# Patient Record
Sex: MALE | Race: AMERICAN INDIAN OR ALASKA NATIVE | NOT HISPANIC OR LATINO | Employment: UNEMPLOYED | URBAN - METROPOLITAN AREA
[De-identification: names, ages, dates, MRNs, and addresses within clinical notes are randomized per-mention and may not be internally consistent; named-entity substitution may affect disease eponyms.]

---

## 2017-12-08 ENCOUNTER — OFFICE VISIT (OUTPATIENT)
Dept: INTERNAL MEDICINE | Facility: MEDICAL CENTER | Age: 42
End: 2017-12-08
Payer: MEDICAID

## 2017-12-08 ENCOUNTER — APPOINTMENT (OUTPATIENT)
Dept: INTERNAL MEDICINE | Facility: MEDICAL CENTER | Age: 42
End: 2017-12-08
Payer: MEDICAID

## 2017-12-08 VITALS
SYSTOLIC BLOOD PRESSURE: 133 MMHG | BODY MASS INDEX: 29.23 KG/M2 | OXYGEN SATURATION: 97 % | DIASTOLIC BLOOD PRESSURE: 95 MMHG | RESPIRATION RATE: 20 BRPM | TEMPERATURE: 98.7 F | WEIGHT: 204.2 LBS | HEIGHT: 70 IN | HEART RATE: 116 BPM

## 2017-12-08 DIAGNOSIS — E11.42 DIABETIC POLYNEUROPATHY ASSOCIATED WITH TYPE 2 DIABETES MELLITUS (HCC): ICD-10-CM

## 2017-12-08 DIAGNOSIS — Z72.0 TOBACCO ABUSE: ICD-10-CM

## 2017-12-08 DIAGNOSIS — Z00.00 HEALTH CARE MAINTENANCE: ICD-10-CM

## 2017-12-08 DIAGNOSIS — E11.51 DM (DIABETES MELLITUS), TYPE 2 WITH PERIPHERAL VASCULAR COMPLICATIONS (HCC): ICD-10-CM

## 2017-12-08 DIAGNOSIS — M79.89 PAIN AND SWELLING OF RIGHT LOWER LEG: ICD-10-CM

## 2017-12-08 DIAGNOSIS — I73.9 PAD (PERIPHERAL ARTERY DISEASE) (HCC): ICD-10-CM

## 2017-12-08 DIAGNOSIS — R03.0 ELEVATED BP WITHOUT DIAGNOSIS OF HYPERTENSION: ICD-10-CM

## 2017-12-08 DIAGNOSIS — M79.661 PAIN AND SWELLING OF RIGHT LOWER LEG: ICD-10-CM

## 2017-12-08 PROBLEM — E11.40 DIABETIC NEUROPATHY ASSOCIATED WITH TYPE 2 DIABETES MELLITUS (HCC): Status: ACTIVE | Noted: 2017-12-08

## 2017-12-08 PROCEDURE — 99204 OFFICE O/P NEW MOD 45 MIN: CPT | Mod: GC | Performed by: INTERNAL MEDICINE

## 2017-12-08 RX ORDER — OXYCODONE HYDROCHLORIDE AND ACETAMINOPHEN 5; 325 MG/1; MG/1
1 TABLET ORAL
Refills: 0 | Status: ON HOLD | COMMUNITY
Start: 2017-11-17 | End: 2022-04-05

## 2017-12-08 ASSESSMENT — PATIENT HEALTH QUESTIONNAIRE - PHQ9
SUM OF ALL RESPONSES TO PHQ QUESTIONS 1-9: 4
CLINICAL INTERPRETATION OF PHQ2 SCORE: 1
5. POOR APPETITE OR OVEREATING: 0 - NOT AT ALL

## 2017-12-08 NOTE — PROGRESS NOTES
New Patient to Establish    Reason to establish: Acute Illness    CC: swelling on the R leg x 2 months    HPI: pt is a 40 YO M with PMH of T2DM, Diabetic Neuropathy, PAD was presented to the clinic for the c/o unilateral painful leg swelling x 2 months. Non progressing, non worsening. Worse with walking, improved with resting and leg elevation. Pain is worse with walking. No associated fever/chills, warmth. No trauma. No preceding long flight or prolong bed rest. No personal hx of malignancy. Denies any other medication use other than insulin, lyrica and norco (given for leg pain)   Pt has a hx of PAD, no SHANIQUA documented. He is a smoker, trying to quit. Has a h/o cellulitis on the L leg 1 yr back, this episode doesn't feel like cellulitis to him.    DMT2: since age 23. Insulin dependent. His last A1C was 1 month back, was 7. Allergic to metformin. uptodate with eye exam, done early this year     Neuropathy 2/2 DM: on lyrica. B/l Lower extremity         Patient Active Problem List    Diagnosis Date Noted   • Diabetic neuropathy associated with type 2 diabetes mellitus (CMS-HCC) 12/08/2017   • Sinus tachycardia 12/02/2014   • DM (diabetes mellitus) (CMS-HCC) 12/02/2014   • Other chest pain 12/02/2014   • PAD (peripheral artery disease) 12/02/2014       Past Medical History:   Diagnosis Date   • Broken arm 2012    R       Current Outpatient Prescriptions   Medication Sig Dispense Refill   • Insulin Glargine (LANTUS SC) Inject 20 Units as instructed 2 Times a Day.     • LYRICA 100 MG Cap Take 100 mg by mouth 2 Times a Day.  0   • oxycodone-acetaminophen (PERCOCET) 5-325 MG Tab Take 1 Tab by mouth 3 times a day.  0     No current facility-administered medications for this visit.        Allergies as of 12/08/2017 - Reviewed 12/08/2017   Allergen Reaction Noted   • Amitriptyline  12/06/2010   • Metformin  12/06/2010       Social History     Social History   • Marital status:      Spouse name: N/A   • Number of  "children: 5   • Years of education: N/A     Occupational History   • unemployed       Social History Main Topics   • Smoking status: Current Every Day Smoker     Packs/day: 0.50     Years: 26.00   • Smokeless tobacco: Never Used   • Alcohol use 1.2 oz/week     2 Cans of beer per week   • Drug use:      Frequency: 7.0 times per week     Types: Marijuana   • Sexual activity: Yes     Partners: Female     Birth control/ protection: Condom     Other Topics Concern   • Not on file     Social History Narrative   • No narrative on file       Family History   Problem Relation Age of Onset   • Diabetes Father    • Heart Failure Father        History reviewed. No pertinent surgical history.    ROS: As per HPI. Additional pertinent symptoms as noted below.    Constitutional: negative for fever/ chills,  Malaise, weight loss and diaphoresis.   HENT: Negative for nosebleeds and sore throat.    Eyes: Negative for blurred vision and redness, vision loss  Respiratory: Negative for cough, hemoptysis and shortness of breath.    Cardiovascular: Negative for chest pain, palpitations, orthopnea, LING. R leg swelling   Gastrointestinal: Negative for heartburn, nausea, vomiting and abdominal pain.   Genitourinary: Negative for dysuria, frequency and hematuria.   Musculoskeletal: negative for joint swelling, pain. Negative for myalgias.  Skin: Negative for itching and rash.   Neurological: Negative for dizziness, tingling, sensory change, focal weakness, seizures and weakness.   Endo/Heme/Allergies: Does not bruise/bleed easily.   Psychiatric/Behavioral: Negative for depression. Substance abuse       /95   Pulse (!) 116   Temp 37.1 °C (98.7 °F)   Resp 20   Ht 1.778 m (5' 10\")   Wt 92.6 kg (204 lb 3.2 oz)   SpO2 97%   BMI 29.30 kg/m²     Physical Exam  General:  Alert and oriented, No apparent distress.    Eyes: Pupils equal and reactive. No scleral icterus.    Throat: Clear no erythema or exudates noted.    Neck: Supple. No " lymphadenopathy noted. Thyroid not enlarged.    Lungs: Clear to auscultation and percussion bilaterally.    Cardiovascular: Regular rate and rhythm. No murmurs, rubs or gallops.    Abdomen:  Benign. No rebound or guarding noted.    Extremities: R LE- non-Pitting pedal edema, tender,no warmth, erythema, port of injury, discharge. No palpable pedal pulses    Skin: LE, B/l scars on the shin       Note: I have reviewed all pertinent labs and diagnostic tests associated with this visit with specific comments listed under the assessment and plan below    Assessment and Plan    1 Pain and swelling of right lower leg  - unilateral, painful, R LE swelling.   DDX- DVT, cellulitis, venous insufficiency, lymphedema, less likely arterial insufficieny, CHF, medications etc  - venous doppler for evaluation of clot. Wells score is 2- high risk.   - if positive then pt needs to be initiated on AC. Will f/u with the pt  - CBC for the blood counts, for cellulitis, if clot is r/o        2. DM (diabetes mellitus), type 2 with peripheral vascular complications (CMS-HCC)  No reported worsening, appears stable. latest A1C was 7. Target A1C is <7.   - on insulin 20U am and 10 U pm. Will await medical record, before ordering the labs.     3. Diabetic polyneuropathy associated with type 2 diabetes mellitus (CMS-HCC)  - uses lyrica. Stable. Diabetic monofilament test was normal today    4. Tobacco abuse  persistent smoker despite of diagnosis of PAD. Has weaned down from 1.5 to 0.5 PPD.   - counseling provided, pt made aware to seek help when he needs it and options like medications exist. Not interested at this moment     5. Elevated BP w/o diagnosis of HTN  - BP goal< 130/90. Will continue to monitor. Pt is asked to maintain BP log and if BP is consistently higher than 130/90 (new ACC guideline)  then initiate lisinopril.       6. PAD (peripheral artery disease) (CMS-HCC)  No SHANIQUA documentation.  Continues to smoke  - pt was counseled to stop  smoking, as this will worsen the condition   - SHANIQUA     7. Health care maintenance  - vaccinations- flu- 2017, due in 2018                           TD uptodate,   Labs- will await for the medical records to prevent duplicates. If not available then will do Lipids, CMP, A1C and TSH on next visit       Followup: Return in about 1 month (around 1/8/2018) for PCP.    Risk Assessment (discuss potential complications a function of chronic problems): discussed    Complexity (discuss number of co-morbidities): discussed     Signed by: Jessa Cardenas M.D.

## 2017-12-08 NOTE — LETTER
[facility], [facility]  Jessa Cardenas M.D.  [address]  [place] [address]  Fax: [phone]   Authorization for Release/Disclosure of   Protected Health Information   Name: JUAN BRIGGS : 1975 SSN: [id number]   Address: [address] [place] [address] [address] [place] [address] Phone:    [phone] (home)    I authorize the entity listed below to release/disclose the PHI below to:   [facility], [facility]/Jessa Cardenas M.D. and Jessa Cardenas M.D.   Provider or Entity Name:  [facility]   [place], [place]   Address   City, State, Zip   Phone:      Fax:     Reason for request: continuity of care   Information to be released:    [  ] LAST COLONOSCOPY,  including any PATH REPORT and follow-up  [  ] LAST FIT/COLOGUARD RESULT [  ] LAST DEXA  [  ] LAST MAMMOGRAM  [  ] LAST PAP  [  ] LAST LABS [  ] RETINA EXAM REPORT  [  ] IMMUNIZATION RECORDS  [  ] Release all info      [  ] Check here and initial the line next to each item to release ALL health information INCLUDING  _____ Care and treatment for drug and / or alcohol abuse  _____ HIV testing, infection status, or AIDS  _____ Genetic Testing    DATES OF SERVICE OR TIME PERIOD TO BE DISCLOSED: _____________  I understand and acknowledge that:  * This Authorization may be revoked at any time by you in writing, except if your health information has already been used or disclosed.  * Your health information that will be used or disclosed as a result of you signing this authorization could be re-disclosed by the recipient. If this occurs, your re-disclosed health information may no longer be protected by State or Federal laws.  * You may refuse to sign this Authorization. Your refusal will not affect your ability to obtain treatment.  * This Authorization becomes effective upon signing and will  on (date) __________.      If no date is indicated, this Authorization will  one (1) year from the signature date.    Name: Juan Briggs    Signature:   Date:     2017            PLEASE FAX REQUESTED RECORDS BACK TO: [phone]

## 2017-12-08 NOTE — PATIENT INSTRUCTIONS
"Keep your leg elevated to reduce the swelling  Decrease salt in take  Keep an eye on your diet.    DASH Eating Plan  DASH stands for \"Dietary Approaches to Stop Hypertension.\" The DASH eating plan is a healthy eating plan that has been shown to reduce high blood pressure (hypertension). Additional health benefits may include reducing the risk of type 2 diabetes mellitus, heart disease, and stroke. The DASH eating plan may also help with weight loss.  WHAT DO I NEED TO KNOW ABOUT THE DASH EATING PLAN?  For the DASH eating plan, you will follow these general guidelines:  · Choose foods with a percent daily value for sodium of less than 5% (as listed on the food label).  · Use salt-free seasonings or herbs instead of table salt or sea salt.  · Check with your health care provider or pharmacist before using salt substitutes.  · Eat lower-sodium products, often labeled as \"lower sodium\" or \"no salt added.\"  · Eat fresh foods.  · Eat more vegetables, fruits, and low-fat dairy products.  · Choose whole grains. Look for the word \"whole\" as the first word in the ingredient list.  · Choose fish and skinless chicken or turkey more often than red meat. Limit fish, poultry, and meat to 6 oz (170 g) each day.  · Limit sweets, desserts, sugars, and sugary drinks.  · Choose heart-healthy fats.  · Limit cheese to 1 oz (28 g) per day.  · Eat more home-cooked food and less restaurant, buffet, and fast food.  · Limit fried foods.  · Cook foods using methods other than frying.  · Limit canned vegetables. If you do use them, rinse them well to decrease the sodium.  · When eating at a restaurant, ask that your food be prepared with less salt, or no salt if possible.  WHAT FOODS CAN I EAT?  Seek help from a dietitian for individual calorie needs.  Grains  Whole grain or whole wheat bread. Brown rice. Whole grain or whole wheat pasta. Quinoa, bulgur, and whole grain cereals. Low-sodium cereals. Corn or whole wheat flour tortillas. Whole " grain cornbread. Whole grain crackers. Low-sodium crackers.  Vegetables  Fresh or frozen vegetables (raw, steamed, roasted, or grilled). Low-sodium or reduced-sodium tomato and vegetable juices. Low-sodium or reduced-sodium tomato sauce and paste. Low-sodium or reduced-sodium canned vegetables.   Fruits  All fresh, canned (in natural juice), or frozen fruits.  Meat and Other Protein Products  Ground beef (85% or leaner), grass-fed beef, or beef trimmed of fat. Skinless chicken or turkey. Ground chicken or turkey. Pork trimmed of fat. All fish and seafood. Eggs. Dried beans, peas, or lentils. Unsalted nuts and seeds. Unsalted canned beans.  Dairy  Low-fat dairy products, such as skim or 1% milk, 2% or reduced-fat cheeses, low-fat ricotta or cottage cheese, or plain low-fat yogurt. Low-sodium or reduced-sodium cheeses.  Fats and Oils  Tub margarines without trans fats. Light or reduced-fat mayonnaise and salad dressings (reduced sodium). Avocado. Safflower, olive, or canola oils. Natural peanut or almond butter.  Other  Unsalted popcorn and pretzels.  The items listed above may not be a complete list of recommended foods or beverages. Contact your dietitian for more options.  WHAT FOODS ARE NOT RECOMMENDED?  Grains  White bread. White pasta. White rice. Refined cornbread. Bagels and croissants. Crackers that contain trans fat.  Vegetables  Creamed or fried vegetables. Vegetables in a cheese sauce. Regular canned vegetables. Regular canned tomato sauce and paste. Regular tomato and vegetable juices.  Fruits  Dried fruits. Canned fruit in light or heavy syrup. Fruit juice.  Meat and Other Protein Products  Fatty cuts of meat. Ribs, chicken wings, [place], sausage, bologna, salami, chitterlings, fatback, hot dogs, bratwurst, and packaged luncheon meats. Salted nuts and seeds. Canned beans with salt.  Dairy  Whole or 2% milk, cream, half-and-half, and cream cheese. Whole-fat or sweetened yogurt. Full-fat cheeses or blue  cheese. Nondairy creamers and whipped toppings. Processed cheese, cheese spreads, or cheese curds.  Condiments  Onion and garlic salt, seasoned salt, table salt, and sea salt. Canned and packaged gravies. Worcestershire sauce. Tartar sauce. Barbecue sauce. Teriyaki sauce. Soy sauce, including reduced sodium. Steak sauce. Fish sauce. Oyster sauce. Cocktail sauce. Horseradish. Ketchup and mustard. Meat flavorings and tenderizers. Bouillon cubes. Hot sauce. Tabasco sauce. Marinades. Taco seasonings. Relishes.  Fats and Oils  Butter, stick margarine, lard, shortening, ghee, and [place] fat. Coconut, palm kernel, or palm oils. Regular salad dressings.  Other  Pickles and olives. Salted popcorn and pretzels.  The items listed above may not be a complete list of foods and beverages to avoid. Contact your dietitian for more information.  WHERE CAN I FIND MORE INFORMATION?  National Heart, [employer], and [employer] [place]: www.nhlbi.nih.gov/health/health-topics/topics/dash/     This information is not intended to replace advice given to you by your health care provider. Make sure you discuss any questions you have with your health care provider.     Document Released: 12/06/2012 Document Revised: 01/08/2016 Document Reviewed: 10/22/2014  [employer] Interactive Patient Education ©2016 [employer]

## 2017-12-08 NOTE — ASSESSMENT & PLAN NOTE
T2DM, age of onset at age 23. Insulin 20 U AM and 10 in evening. No h/o DKA  A1C last  Month was 7

## 2017-12-15 ENCOUNTER — APPOINTMENT (OUTPATIENT)
Dept: RADIOLOGY | Facility: MEDICAL CENTER | Age: 42
End: 2017-12-15
Attending: INTERNAL MEDICINE
Payer: MEDICAID

## 2022-04-01 ENCOUNTER — HOSPITAL ENCOUNTER (OUTPATIENT)
Facility: MEDICAL CENTER | Age: 47
End: 2022-04-01
Attending: PHYSICAL MEDICINE & REHABILITATION
Payer: MEDICAID

## 2022-04-01 ENCOUNTER — HOSPITAL ENCOUNTER (INPATIENT)
Facility: REHABILITATION | Age: 47
LOS: 4 days | DRG: 560 | End: 2022-04-05
Attending: PHYSICAL MEDICINE & REHABILITATION | Admitting: PHYSICAL MEDICINE & REHABILITATION
Payer: MEDICAID

## 2022-04-01 DIAGNOSIS — E11.65 TYPE 2 DIABETES MELLITUS WITH HYPERGLYCEMIA, UNSPECIFIED WHETHER LONG TERM INSULIN USE (HCC): ICD-10-CM

## 2022-04-01 DIAGNOSIS — E55.9 VITAMIN D DEFICIENCY: ICD-10-CM

## 2022-04-01 DIAGNOSIS — G54.6 PHANTOM LIMB PAIN (HCC): ICD-10-CM

## 2022-04-01 DIAGNOSIS — I10 PRIMARY HYPERTENSION: ICD-10-CM

## 2022-04-01 DIAGNOSIS — Z89.512 S/P BKA (BELOW KNEE AMPUTATION) UNILATERAL, LEFT (HCC): ICD-10-CM

## 2022-04-01 PROBLEM — Z00.00 HEALTH CARE MAINTENANCE: Status: RESOLVED | Noted: 2017-12-08 | Resolved: 2022-04-01

## 2022-04-01 LAB
GLUCOSE BLD STRIP.AUTO-MCNC: 175 MG/DL (ref 65–99)
GLUCOSE BLD STRIP.AUTO-MCNC: 181 MG/DL (ref 65–99)
GLUCOSE BLD STRIP.AUTO-MCNC: 226 MG/DL (ref 65–99)

## 2022-04-01 PROCEDURE — 0240U HCHG SARS-COV-2 COVID-19 NFCT DS RESP RNA 3 TRGT MIC: CPT

## 2022-04-01 PROCEDURE — 770010 HCHG ROOM/CARE - REHAB SEMI PRIVAT*

## 2022-04-01 PROCEDURE — 82962 GLUCOSE BLOOD TEST: CPT | Mod: 91

## 2022-04-01 PROCEDURE — 700111 HCHG RX REV CODE 636 W/ 250 OVERRIDE (IP): Performed by: PHYSICAL MEDICINE & REHABILITATION

## 2022-04-01 PROCEDURE — 700102 HCHG RX REV CODE 250 W/ 637 OVERRIDE(OP): Performed by: PHYSICAL MEDICINE & REHABILITATION

## 2022-04-01 PROCEDURE — 99223 1ST HOSP IP/OBS HIGH 75: CPT | Performed by: PHYSICAL MEDICINE & REHABILITATION

## 2022-04-01 PROCEDURE — 94760 N-INVAS EAR/PLS OXIMETRY 1: CPT

## 2022-04-01 PROCEDURE — A9270 NON-COVERED ITEM OR SERVICE: HCPCS | Performed by: PHYSICAL MEDICINE & REHABILITATION

## 2022-04-01 RX ORDER — LISINOPRIL 20 MG/1
20 TABLET ORAL
Status: DISCONTINUED | OUTPATIENT
Start: 2022-04-02 | End: 2022-04-03

## 2022-04-01 RX ORDER — AMOXICILLIN 250 MG
2 CAPSULE ORAL 2 TIMES DAILY
Status: DISCONTINUED | OUTPATIENT
Start: 2022-04-01 | End: 2022-04-05 | Stop reason: HOSPADM

## 2022-04-01 RX ORDER — OXYCODONE HYDROCHLORIDE 5 MG/1
5 TABLET ORAL EVERY 4 HOURS PRN
Status: DISCONTINUED | OUTPATIENT
Start: 2022-04-01 | End: 2022-04-05 | Stop reason: HOSPADM

## 2022-04-01 RX ORDER — POLYETHYLENE GLYCOL 3350 17 G/17G
1 POWDER, FOR SOLUTION ORAL
Status: DISCONTINUED | OUTPATIENT
Start: 2022-04-01 | End: 2022-04-05 | Stop reason: HOSPADM

## 2022-04-01 RX ORDER — ONDANSETRON 4 MG/1
4 TABLET, ORALLY DISINTEGRATING ORAL 4 TIMES DAILY PRN
Status: DISCONTINUED | OUTPATIENT
Start: 2022-04-01 | End: 2022-04-05 | Stop reason: HOSPADM

## 2022-04-01 RX ORDER — DEXTROSE MONOHYDRATE 25 G/50ML
25 INJECTION, SOLUTION INTRAVENOUS
Status: DISCONTINUED | OUTPATIENT
Start: 2022-04-01 | End: 2022-04-01

## 2022-04-01 RX ORDER — ACETAMINOPHEN 325 MG/1
650 TABLET ORAL EVERY 6 HOURS PRN
Status: DISCONTINUED | OUTPATIENT
Start: 2022-04-06 | End: 2022-04-05 | Stop reason: HOSPADM

## 2022-04-01 RX ORDER — TRAZODONE HYDROCHLORIDE 50 MG/1
50 TABLET ORAL
Status: DISCONTINUED | OUTPATIENT
Start: 2022-04-01 | End: 2022-04-05 | Stop reason: HOSPADM

## 2022-04-01 RX ORDER — ONDANSETRON 2 MG/ML
4 INJECTION INTRAMUSCULAR; INTRAVENOUS 4 TIMES DAILY PRN
Status: DISCONTINUED | OUTPATIENT
Start: 2022-04-01 | End: 2022-04-05 | Stop reason: HOSPADM

## 2022-04-01 RX ORDER — ECHINACEA PURPUREA EXTRACT 125 MG
2 TABLET ORAL PRN
Status: DISCONTINUED | OUTPATIENT
Start: 2022-04-01 | End: 2022-04-05 | Stop reason: HOSPADM

## 2022-04-01 RX ORDER — ACETAMINOPHEN 325 MG/1
650 TABLET ORAL EVERY 6 HOURS
Status: DISCONTINUED | OUTPATIENT
Start: 2022-04-01 | End: 2022-04-05 | Stop reason: HOSPADM

## 2022-04-01 RX ORDER — OXYCODONE HYDROCHLORIDE 5 MG/1
5 TABLET ORAL EVERY 4 HOURS PRN
Status: DISCONTINUED | OUTPATIENT
Start: 2022-04-01 | End: 2022-04-01

## 2022-04-01 RX ORDER — POLYVINYL ALCOHOL 14 MG/ML
1 SOLUTION/ DROPS OPHTHALMIC PRN
Status: DISCONTINUED | OUTPATIENT
Start: 2022-04-01 | End: 2022-04-05 | Stop reason: HOSPADM

## 2022-04-01 RX ORDER — OMEPRAZOLE 20 MG/1
20 CAPSULE, DELAYED RELEASE ORAL DAILY
Status: DISCONTINUED | OUTPATIENT
Start: 2022-04-01 | End: 2022-04-05 | Stop reason: HOSPADM

## 2022-04-01 RX ORDER — GABAPENTIN 300 MG/1
300 CAPSULE ORAL
Status: DISCONTINUED | OUTPATIENT
Start: 2022-04-01 | End: 2022-04-05

## 2022-04-01 RX ORDER — DEXTROSE MONOHYDRATE 25 G/50ML
25 INJECTION, SOLUTION INTRAVENOUS
Status: DISCONTINUED | OUTPATIENT
Start: 2022-04-01 | End: 2022-04-02

## 2022-04-01 RX ORDER — BISACODYL 10 MG
10 SUPPOSITORY, RECTAL RECTAL
Status: DISCONTINUED | OUTPATIENT
Start: 2022-04-01 | End: 2022-04-05 | Stop reason: HOSPADM

## 2022-04-01 RX ORDER — ACETAMINOPHEN 325 MG/1
650 TABLET ORAL EVERY 4 HOURS PRN
Status: DISCONTINUED | OUTPATIENT
Start: 2022-04-01 | End: 2022-04-05 | Stop reason: HOSPADM

## 2022-04-01 RX ORDER — ALUMINA, MAGNESIA, AND SIMETHICONE 2400; 2400; 240 MG/30ML; MG/30ML; MG/30ML
20 SUSPENSION ORAL
Status: DISCONTINUED | OUTPATIENT
Start: 2022-04-01 | End: 2022-04-05 | Stop reason: HOSPADM

## 2022-04-01 RX ADMIN — ENOXAPARIN SODIUM 40 MG: 40 INJECTION SUBCUTANEOUS at 17:21

## 2022-04-01 RX ADMIN — ACETAMINOPHEN 650 MG: 325 TABLET ORAL at 17:21

## 2022-04-01 RX ADMIN — OXYCODONE 5 MG: 5 TABLET ORAL at 17:21

## 2022-04-01 RX ADMIN — INSULIN HUMAN 1 UNITS: 100 INJECTION, SOLUTION PARENTERAL at 21:19

## 2022-04-01 RX ADMIN — ACETAMINOPHEN 650 MG: 325 TABLET ORAL at 12:44

## 2022-04-01 RX ADMIN — GABAPENTIN 300 MG: 300 CAPSULE ORAL at 21:18

## 2022-04-01 RX ADMIN — INSULIN HUMAN 2 UNITS: 100 INJECTION, SOLUTION PARENTERAL at 12:46

## 2022-04-01 RX ADMIN — INSULIN HUMAN 1 UNITS: 100 INJECTION, SOLUTION PARENTERAL at 17:24

## 2022-04-01 RX ADMIN — SENNOSIDES AND DOCUSATE SODIUM 2 TABLET: 50; 8.6 TABLET ORAL at 12:44

## 2022-04-01 RX ADMIN — OMEPRAZOLE 20 MG: 20 CAPSULE, DELAYED RELEASE ORAL at 12:44

## 2022-04-01 RX ADMIN — INSULIN GLARGINE 10 UNITS: 100 INJECTION, SOLUTION SUBCUTANEOUS at 21:20

## 2022-04-01 RX ADMIN — OXYCODONE 5 MG: 5 TABLET ORAL at 13:19

## 2022-04-01 RX ADMIN — OXYCODONE 5 MG: 5 TABLET ORAL at 21:17

## 2022-04-01 ASSESSMENT — PATIENT HEALTH QUESTIONNAIRE - PHQ9
6. FEELING BAD ABOUT YOURSELF - OR THAT YOU ARE A FAILURE OR HAVE LET YOURSELF OR YOUR FAMILY DOWN: SEVERAL DAYS
SUM OF ALL RESPONSES TO PHQ QUESTIONS 1-9: 9
8. MOVING OR SPEAKING SO SLOWLY THAT OTHER PEOPLE COULD HAVE NOTICED. OR THE OPPOSITE, BEING SO FIGETY OR RESTLESS THAT YOU HAVE BEEN MOVING AROUND A LOT MORE THAN USUAL: SEVERAL DAYS
7. TROUBLE CONCENTRATING ON THINGS, SUCH AS READING THE NEWSPAPER OR WATCHING TELEVISION: SEVERAL DAYS
1. LITTLE INTEREST OR PLEASURE IN DOING THINGS: SEVERAL DAYS
3. TROUBLE FALLING OR STAYING ASLEEP OR SLEEPING TOO MUCH: SEVERAL DAYS
5. POOR APPETITE OR OVEREATING: SEVERAL DAYS
SUM OF ALL RESPONSES TO PHQ9 QUESTIONS 1 AND 2: 2
4. FEELING TIRED OR HAVING LITTLE ENERGY: SEVERAL DAYS
2. FEELING DOWN, DEPRESSED, IRRITABLE, OR HOPELESS: SEVERAL DAYS
9. THOUGHTS THAT YOU WOULD BE BETTER OFF DEAD, OR OF HURTING YOURSELF: SEVERAL DAYS

## 2022-04-01 ASSESSMENT — LIFESTYLE VARIABLES
TOTAL SCORE: 0
EVER_SMOKED: YES
EVER HAD A DRINK FIRST THING IN THE MORNING TO STEADY YOUR NERVES TO GET RID OF A HANGOVER: NO
HAVE YOU EVER FELT YOU SHOULD CUT DOWN ON YOUR DRINKING: NO
CONSUMPTION TOTAL: NEGATIVE
AVERAGE NUMBER OF DAYS PER WEEK YOU HAVE A DRINK CONTAINING ALCOHOL: 0
EVER FELT BAD OR GUILTY ABOUT YOUR DRINKING: NO
TOTAL SCORE: 0
HOW MANY TIMES IN THE PAST YEAR HAVE YOU HAD 5 OR MORE DRINKS IN A DAY: 0
HAVE PEOPLE ANNOYED YOU BY CRITICIZING YOUR DRINKING: NO
TOTAL SCORE: 0
ALCOHOL_USE: NO
ON A TYPICAL DAY WHEN YOU DRINK ALCOHOL HOW MANY DRINKS DO YOU HAVE: 0

## 2022-04-01 ASSESSMENT — PAIN DESCRIPTION - PAIN TYPE
TYPE: ACUTE PAIN;SURGICAL PAIN;NEUROPATHIC PAIN
TYPE: PHANTOM PAIN
TYPE: PHANTOM PAIN

## 2022-04-01 NOTE — PROGRESS NOTES
Patient admitted to facility at w/c via [facility] transport.  Patient assisted to room and positioned in bed for comfort and safety; call light within reach.  Patient assisted with stowing belongings and oriented to room and facility.  Admission assessment performed and documented in computer.  Admission paperwork completed; signed copies placed in chart.  Will continue to monitor.

## 2022-04-01 NOTE — PROGRESS NOTES
4 Eyes Skin Assessment Completed by YARITZA Diaz and Jhonny RN.    Head WDL  Ears WDL  Nose WDL  Mouth WDL  Neck WDL  Breast/Chest WDL  Shoulder Blades WDL  Spine WDL  (R) Arm/Elbow/Hand WDL  (L) Arm/Elbow/Hand WDL  Abdomen WDL  Groin WDL  Scrotum/Coccyx/Buttocks Blanching dry flaky itch cream see photo moisture barrier applied  (R) Leg WDL  (L) Leg Swelling BKA  See photo. Dressing changed  (R) Heel/Foot/Toe Scab on toes thick hard nails.  (L) Heel/Foot/Toe BKA          Devices In Places N/A      Interventions In Place Pillows and Barrier Cream    Possible Skin Injury N/A    Pictures Uploaded Into Epic YES  Wound Consult Placed N/A  RN Wound Prevention Protocol Ordered No

## 2022-04-02 ENCOUNTER — APPOINTMENT (OUTPATIENT)
Dept: RADIOLOGY | Facility: REHABILITATION | Age: 47
DRG: 560 | End: 2022-04-02
Attending: PHYSICAL MEDICINE & REHABILITATION
Payer: MEDICAID

## 2022-04-02 PROBLEM — R74.8 ALKALINE PHOSPHATASE ELEVATION: Status: ACTIVE | Noted: 2022-04-02

## 2022-04-02 PROBLEM — E55.9 VITAMIN D DEFICIENCY: Status: ACTIVE | Noted: 2022-04-02

## 2022-04-02 PROBLEM — D72.829 LEUKOCYTOSIS: Status: ACTIVE | Noted: 2022-04-02

## 2022-04-02 PROBLEM — D64.9 ANEMIA: Status: ACTIVE | Noted: 2022-04-02

## 2022-04-02 PROBLEM — I10 HYPERTENSION: Status: ACTIVE | Noted: 2017-12-08

## 2022-04-02 PROBLEM — D75.839 THROMBOCYTOSIS: Status: ACTIVE | Noted: 2022-04-02

## 2022-04-02 LAB
25(OH)D3 SERPL-MCNC: 6 NG/ML (ref 30–100)
ALBUMIN SERPL BCP-MCNC: 2.8 G/DL (ref 3.2–4.9)
ALBUMIN/GLOB SERPL: 0.5 G/DL
ALP SERPL-CCNC: 139 U/L (ref 30–99)
ALT SERPL-CCNC: 15 U/L (ref 2–50)
ANION GAP SERPL CALC-SCNC: 11 MMOL/L (ref 7–16)
APPEARANCE UR: CLEAR
AST SERPL-CCNC: 26 U/L (ref 12–45)
BACTERIA #/AREA URNS HPF: NEGATIVE /HPF
BASOPHILS # BLD AUTO: 0.3 % (ref 0–1.8)
BASOPHILS # BLD: 0.04 K/UL (ref 0–0.12)
BILIRUB SERPL-MCNC: 0.3 MG/DL (ref 0.1–1.5)
BILIRUB UR QL STRIP.AUTO: NEGATIVE
BUN SERPL-MCNC: 19 MG/DL (ref 8–22)
CALCIUM SERPL-MCNC: 9.2 MG/DL (ref 8.5–10.5)
CHLORIDE SERPL-SCNC: 100 MMOL/L (ref 96–112)
CO2 SERPL-SCNC: 23 MMOL/L (ref 20–33)
COLOR UR: YELLOW
CREAT SERPL-MCNC: 0.89 MG/DL (ref 0.5–1.4)
EOSINOPHIL # BLD AUTO: 0.28 K/UL (ref 0–0.51)
EOSINOPHIL NFR BLD: 2.3 % (ref 0–6.9)
EPI CELLS #/AREA URNS HPF: NEGATIVE /HPF
ERYTHROCYTE [DISTWIDTH] IN BLOOD BY AUTOMATED COUNT: 42.4 FL (ref 35.9–50)
FLUAV RNA SPEC QL NAA+PROBE: NEGATIVE
FLUBV RNA SPEC QL NAA+PROBE: NEGATIVE
GFR SERPLBLD CREATININE-BSD FMLA CKD-EPI: 106 ML/MIN/1.73 M 2
GLOBULIN SER CALC-MCNC: 5.2 G/DL (ref 1.9–3.5)
GLUCOSE BLD STRIP.AUTO-MCNC: 111 MG/DL (ref 65–99)
GLUCOSE BLD STRIP.AUTO-MCNC: 192 MG/DL (ref 65–99)
GLUCOSE BLD STRIP.AUTO-MCNC: 202 MG/DL (ref 65–99)
GLUCOSE BLD STRIP.AUTO-MCNC: 242 MG/DL (ref 65–99)
GLUCOSE SERPL-MCNC: 142 MG/DL (ref 65–99)
GLUCOSE UR STRIP.AUTO-MCNC: 250 MG/DL
HCT VFR BLD AUTO: 32.3 % (ref 42–52)
HGB BLD-MCNC: 10.8 G/DL (ref 14–18)
HYALINE CASTS #/AREA URNS LPF: ABNORMAL /LPF
IMM GRANULOCYTES # BLD AUTO: 0.08 K/UL (ref 0–0.11)
IMM GRANULOCYTES NFR BLD AUTO: 0.7 % (ref 0–0.9)
KETONES UR STRIP.AUTO-MCNC: NEGATIVE MG/DL
LEUKOCYTE ESTERASE UR QL STRIP.AUTO: NEGATIVE
LYMPHOCYTES # BLD AUTO: 1.99 K/UL (ref 1–4.8)
LYMPHOCYTES NFR BLD: 16.4 % (ref 22–41)
MCH RBC QN AUTO: 29.3 PG (ref 27–33)
MCHC RBC AUTO-ENTMCNC: 33.4 G/DL (ref 33.7–35.3)
MCV RBC AUTO: 87.8 FL (ref 81.4–97.8)
MICRO URNS: ABNORMAL
MONOCYTES # BLD AUTO: 0.53 K/UL (ref 0–0.85)
MONOCYTES NFR BLD AUTO: 4.4 % (ref 0–13.4)
NEUTROPHILS # BLD AUTO: 9.21 K/UL (ref 1.82–7.42)
NEUTROPHILS NFR BLD: 75.9 % (ref 44–72)
NITRITE UR QL STRIP.AUTO: NEGATIVE
NRBC # BLD AUTO: 0 K/UL
NRBC BLD-RTO: 0 /100 WBC
PH UR STRIP.AUTO: 6.5 [PH] (ref 5–8)
PLATELET # BLD AUTO: 453 K/UL (ref 164–446)
PMV BLD AUTO: 9.4 FL (ref 9–12.9)
POTASSIUM SERPL-SCNC: 4 MMOL/L (ref 3.6–5.5)
PROT SERPL-MCNC: 8 G/DL (ref 6–8.2)
PROT UR QL STRIP: 100 MG/DL
RBC # BLD AUTO: 3.68 M/UL (ref 4.7–6.1)
RBC # URNS HPF: ABNORMAL /HPF
RBC UR QL AUTO: ABNORMAL
SARS-COV-2 RNA RESP QL NAA+PROBE: NOTDETECTED
SODIUM SERPL-SCNC: 134 MMOL/L (ref 135–145)
SP GR UR STRIP.AUTO: 1.01
SPECIMEN SOURCE: NORMAL
UROBILINOGEN UR STRIP.AUTO-MCNC: 0.2 MG/DL
WBC # BLD AUTO: 12.1 K/UL (ref 4.8–10.8)
WBC #/AREA URNS HPF: ABNORMAL /HPF

## 2022-04-02 PROCEDURE — 97166 OT EVAL MOD COMPLEX 45 MIN: CPT

## 2022-04-02 PROCEDURE — 85025 COMPLETE CBC W/AUTO DIFF WBC: CPT

## 2022-04-02 PROCEDURE — 97535 SELF CARE MNGMENT TRAINING: CPT

## 2022-04-02 PROCEDURE — 80053 COMPREHEN METABOLIC PANEL: CPT

## 2022-04-02 PROCEDURE — 71045 X-RAY EXAM CHEST 1 VIEW: CPT

## 2022-04-02 PROCEDURE — 82306 VITAMIN D 25 HYDROXY: CPT

## 2022-04-02 PROCEDURE — 81001 URINALYSIS AUTO W/SCOPE: CPT

## 2022-04-02 PROCEDURE — A9270 NON-COVERED ITEM OR SERVICE: HCPCS | Performed by: PHYSICAL MEDICINE & REHABILITATION

## 2022-04-02 PROCEDURE — 83036 HEMOGLOBIN GLYCOSYLATED A1C: CPT

## 2022-04-02 PROCEDURE — 700111 HCHG RX REV CODE 636 W/ 250 OVERRIDE (IP): Performed by: PHYSICAL MEDICINE & REHABILITATION

## 2022-04-02 PROCEDURE — 97110 THERAPEUTIC EXERCISES: CPT

## 2022-04-02 PROCEDURE — 97530 THERAPEUTIC ACTIVITIES: CPT

## 2022-04-02 PROCEDURE — 770010 HCHG ROOM/CARE - REHAB SEMI PRIVAT*

## 2022-04-02 PROCEDURE — 99233 SBSQ HOSP IP/OBS HIGH 50: CPT | Performed by: PHYSICAL MEDICINE & REHABILITATION

## 2022-04-02 PROCEDURE — 82962 GLUCOSE BLOOD TEST: CPT | Mod: 91

## 2022-04-02 PROCEDURE — 97161 PT EVAL LOW COMPLEX 20 MIN: CPT

## 2022-04-02 PROCEDURE — 700102 HCHG RX REV CODE 250 W/ 637 OVERRIDE(OP): Performed by: PHYSICAL MEDICINE & REHABILITATION

## 2022-04-02 PROCEDURE — 36415 COLL VENOUS BLD VENIPUNCTURE: CPT

## 2022-04-02 PROCEDURE — 99253 IP/OBS CNSLTJ NEW/EST LOW 45: CPT | Performed by: HOSPITALIST

## 2022-04-02 RX ORDER — ERGOCALCIFEROL 1.25 MG/1
50000 CAPSULE ORAL
Status: DISCONTINUED | OUTPATIENT
Start: 2022-04-02 | End: 2022-04-05 | Stop reason: HOSPADM

## 2022-04-02 RX ORDER — DEXTROSE MONOHYDRATE 25 G/50ML
25 INJECTION, SOLUTION INTRAVENOUS
Status: DISCONTINUED | OUTPATIENT
Start: 2022-04-02 | End: 2022-04-05 | Stop reason: HOSPADM

## 2022-04-02 RX ADMIN — ACETAMINOPHEN 650 MG: 325 TABLET ORAL at 23:28

## 2022-04-02 RX ADMIN — ACETAMINOPHEN 650 MG: 325 TABLET ORAL at 11:27

## 2022-04-02 RX ADMIN — ERGOCALCIFEROL 50000 UNITS: 1.25 CAPSULE ORAL at 11:22

## 2022-04-02 RX ADMIN — ACETAMINOPHEN 650 MG: 325 TABLET ORAL at 04:59

## 2022-04-02 RX ADMIN — GABAPENTIN 300 MG: 300 CAPSULE ORAL at 21:56

## 2022-04-02 RX ADMIN — INSULIN HUMAN 2 UNITS: 100 INJECTION, SOLUTION PARENTERAL at 11:22

## 2022-04-02 RX ADMIN — INSULIN HUMAN 1 UNITS: 100 INJECTION, SOLUTION PARENTERAL at 08:07

## 2022-04-02 RX ADMIN — OXYCODONE 5 MG: 5 TABLET ORAL at 15:17

## 2022-04-02 RX ADMIN — ACETAMINOPHEN 650 MG: 325 TABLET ORAL at 17:07

## 2022-04-02 RX ADMIN — ENOXAPARIN SODIUM 40 MG: 40 INJECTION SUBCUTANEOUS at 15:17

## 2022-04-02 RX ADMIN — SENNOSIDES AND DOCUSATE SODIUM 2 TABLET: 50; 8.6 TABLET ORAL at 08:09

## 2022-04-02 RX ADMIN — INSULIN GLARGINE 10 UNITS: 100 INJECTION, SOLUTION SUBCUTANEOUS at 21:59

## 2022-04-02 RX ADMIN — OMEPRAZOLE 20 MG: 20 CAPSULE, DELAYED RELEASE ORAL at 08:09

## 2022-04-02 RX ADMIN — OXYCODONE 5 MG: 5 TABLET ORAL at 04:59

## 2022-04-02 RX ADMIN — OXYCODONE 5 MG: 5 TABLET ORAL at 19:27

## 2022-04-02 ASSESSMENT — GAIT ASSESSMENTS
GAIT LEVEL OF ASSIST: CONTACT GUARD ASSIST
DEVIATION: STEP TO;BRADYKINETIC
ASSISTIVE DEVICE: FRONT WHEEL WALKER
DISTANCE (FEET): 120

## 2022-04-02 ASSESSMENT — ENCOUNTER SYMPTOMS
CHILLS: 0
EYES NEGATIVE: 1
NAUSEA: 0
WEAKNESS: 1
COUGH: 0
SHORTNESS OF BREATH: 0
ABDOMINAL PAIN: 0
VOMITING: 0
POLYDIPSIA: 0
BRUISES/BLEEDS EASILY: 0
FEVER: 0
PALPITATIONS: 0

## 2022-04-02 ASSESSMENT — BRIEF INTERVIEW FOR MENTAL STATUS (BIMS)
WHAT DAY OF THE WEEK IS IT: CORRECT
WHAT YEAR IS IT: CORRECT
BIMS SUMMARY SCORE: 13
INITIAL REPETITION OF BED BLUE SOCK - FIRST ATTEMPT: 3
ASKED TO RECALL BLUE: YES, NO CUE REQUIRED
WHAT MONTH IS IT: ACCURATE WITHIN 5 DAYS
ASKED TO RECALL BED: NO, COULD NOT RECALL
ASKED TO RECALL SOCK: YES, NO CUE REQUIRED

## 2022-04-02 ASSESSMENT — ACTIVITIES OF DAILY LIVING (ADL)
TOILETING: INDEPENDENT
BED_CHAIR_WHEELCHAIR_TRANSFER_DESCRIPTION: ADAPTIVE EQUIPMENT
TOILETING_LEVEL_OF_ASSIST_DESCRIPTION: INITIAL PREPARATION FOR TASK;SUPERVISION FOR SAFETY
BED_CHAIR_WHEELCHAIR_TRANSFER_DESCRIPTION: ADAPTIVE EQUIPMENT;SUPERVISION FOR SAFETY
BED_CHAIR_WHEELCHAIR_TRANSFER_DESCRIPTION: ADAPTIVE EQUIPMENT;SUPERVISION FOR SAFETY
TOILET_TRANSFER_DESCRIPTION: ADAPTIVE EQUIPMENT;INCREASED TIME;SUPERVISION FOR SAFETY
TUB_SHOWER_TRANSFER_DESCRIPTION: SHOWER BENCH;INCREASED TIME;SET-UP OF EQUIPMENT;SUPERVISION FOR SAFETY

## 2022-04-02 ASSESSMENT — PAIN DESCRIPTION - PAIN TYPE
TYPE: PHANTOM PAIN
TYPE: PHANTOM PAIN
TYPE: ACUTE PAIN;SURGICAL PAIN;PHANTOM PAIN

## 2022-04-02 NOTE — CARE PLAN
Problem: Knowledge Deficit - Standard  Goal: Patient and family/care givers will demonstrate understanding of plan of care, disease process/condition, diagnostic tests and medications  Outcome: Progressing   Pt education given regarding plan of care, pt shows understanding, with emphasis on admit to rehab, will continue to reinforce education and continue to monitor.         Problem: Fall Risk - Rehab  Goal: Patient will remain free from falls  Outcome: Progressing   Pt education given regarding fall precautions AND safety measures, pt shows good understanding, has not attempted to self transfer this shift, will continue to reinforce education and continue to monitor.

## 2022-04-02 NOTE — PROGRESS NOTES
Took over care pt from admit RN, pt resting in bed, call light with in reach, bed in lowest position, all fall precautions and alarms in  Place, pt denies any further needs at this time, shows nos/s of distress, will continue to monitor.

## 2022-04-02 NOTE — PROGRESS NOTES
Patient care assumed. Report received from day RN Jhonny. Patient is alert and calm, resting in bed. Call light and bedside table within reach. Will continue to monitor.

## 2022-04-02 NOTE — THERAPY
Physical Therapy   Daily Treatment     Patient Name: Juan Lantigua  Age:  46 y.o., Sex:  male  Medical Record #: [id number]  Today's Date: 4/2/2022     Precautions  Precautions: (P) Fall Risk,Non Weight Bearing Left Lower Extremity    Subjective    I just called the nurse for pain medication     Objective     04/02/22 1501   Precautions   Precautions Fall Risk;Non Weight Bearing Left Lower Extremity   Pain   Intervention Medication (see MAR)   Pain 0 - 10 Group   Location Leg   Location Orientation Left   Pain Rating Scale (NPRS) 7   Description Throbbing  (phantom)   Therapist Pain Assessment Nurse Notified   Non Verbal Descriptors   Non Verbal Scale  Calm   Supine Lower Body Exercise   Hip Abduction 1 set of 10;Side Lying;Left   Hip Adduction  1 set of 10;Bilateral   Straight Leg Raises 1 set of 10;Front;Left   Knee to Chest 1 set of 10;Left   Short Arc Quad 1 set of 10;Left   Quadriceps Isometrics 1 set of 10;Bilateral   Other Exercises hip extension in sidelying, L LE 10 reps   Comments prone lying x10 minutes with manual hip flexor stretch, 30 seconds x 3   Neuro-Muscular Treatments   Neuro-Muscular Treatments Sequencing;Verbal Cuing   Interdisciplinary Plan of Care Collaboration   IDT Collaboration with  Nursing   Patient Position at End of Therapy In Bed;Tray Table within Reach;Call Light within Reach;Phone within Reach   Collaboration Comments pain level   PT Total Time Spent   PT Individual Total Time Spent (Mins) 30   PT Charge Group   PT Therapeutic Exercise 2       Assessment    Handout reviewed regarding LE strengthening for L residual limb. Patient able to complete all exercises with VC and tactile cues. Modified hip abduction and hip extension of L LE in sidelying.  Patient very motivated for all handouts and stretches. Pain limiting OOB activities.     Strengths: Able to follow instructions,Adequate strength,Alert and oriented,Effective communication skills,Independent prior level of function,Motivated  for self care and independence,Pleasant and cooperative,Willingly participates in therapeutic activities  Barriers: Limited mobility,Pain,Fatigue    Plan    WC mobility indoor/outdoor, gait with FWW, progress HEP, transfer training from different surface heights. Residual limb pain management.    Passport items to be completed:  Get in/out of bed safely, in/out of a vehicle, safely use mobility device, walk or wheel around home/community, navigate up and down stairs, show how to get up/down from the ground, ensure home is accessible, demonstrate HEP, complete caregiver training    Physical Therapy Problems (Active)       Problem: Balance       Dates: Start: 04/02/22         Goal: STG-Within one week, patient will maintain dynamic standing x 5 min with FWW, SBA       Dates: Start: 04/02/22               Problem: Mobility       Dates: Start: 04/02/22         Goal: STG-Within one week, patient will propel wheelchair community distances  Lucinda over indoor surfaces, supervised outdoor surfaces, >300ft       Dates: Start: 04/02/22            Goal: STG-Within one week, patient will ambulate household distance with FWW, SBA, 150ft.       Dates: Start: 04/02/22            Goal: STG-Within one week, patient will ambulate up/down a curb with FWW, SBA       Dates: Start: 04/02/22               Problem: Mobility Transfers       Dates: Start: 04/02/22         Goal: STG-Within one week, patient will transfer bed to chair supervised with LRAD       Dates: Start: 04/02/22               Problem: PT-Long Term Goals       Dates: Start: 04/02/22         Goal: LTG-By discharge, patient will tolerate standing x 10 minutes with FWW, Lucinda, pain <3/10       Dates: Start: 04/02/22            Goal: LTG-By discharge, patient will ambulate x 200 ft with FWW, Lucinda       Dates: Start: 04/02/22            Goal: LTG-By discharge, patient will transfer one surface to another Lucinda with LRAD       Dates: Start: 04/02/22            Goal: LTG-By  discharge, patient will perform home exercise program Lucinda for stretching/strengthening L residual limb       Dates: Start: 04/02/22            Goal: LTG-By discharge, patient will ambulate up/down 4-6 stairs, SBA with LRAD/railings.       Dates: Start: 04/02/22

## 2022-04-02 NOTE — PROGRESS NOTES
"Rehab Progress Note     Date of Service: 4/2/2022  Chief Complaint: follow up BKA    Interval Events (Subjective)    Patient seen and examined today in his room.   He reports feeling very tired.  He denies any pain currently.  Last moved his bowels yesterday.  Hospitalist consulted for his medical co-morbidities.  Patient has no other complaints today.    ROS: No changes to bowel, bladder, pain, mood, or sleep.       Objective:  VITAL SIGNS: /68   Pulse (!) 102   Temp 36.7 °C (98 °F)   Resp 18   Ht 1.778 m (5' 10\")   Wt 81.5 kg (179 lb 10.8 oz)   SpO2 98%   BMI 25.78 kg/m²   Gen: somnolent, but arousable, no apparent distress  CV: mild tachycardia  Resp: clear to ascultation bilaterally  Msk: left transtibial amputation wrapped, photos reviewed      Recent Results (from the past 72 hour(s))   POCT glucose device results    Collection Time: 04/01/22 11:58 AM   Result Value Ref Range    POC Glucose, Blood 226 (H) 65 - 99 mg/dL   CoV-2 and Flu A/B by PCR (Roche/TF)    Collection Time: 04/01/22 12:06 PM   Result Value Ref Range    SARS-CoV-2 Source Nasal Swab    POCT glucose device results    Collection Time: 04/01/22  5:19 PM   Result Value Ref Range    POC Glucose, Blood 181 (H) 65 - 99 mg/dL   POCT glucose device results    Collection Time: 04/01/22  8:42 PM   Result Value Ref Range    POC Glucose, Blood 175 (H) 65 - 99 mg/dL   CBC with Differential    Collection Time: 04/02/22  6:02 AM   Result Value Ref Range    WBC 12.1 (H) 4.8 - 10.8 K/uL    RBC 3.68 (L) 4.70 - 6.10 M/uL    Hemoglobin 10.8 (L) 14.0 - 18.0 g/dL    Hematocrit 32.3 (L) 42.0 - 52.0 %    MCV 87.8 81.4 - 97.8 fL    MCH 29.3 27.0 - 33.0 pg    MCHC 33.4 (L) 33.7 - 35.3 g/dL    RDW 42.4 35.9 - 50.0 fL    Platelet Count 453 (H) 164 - 446 K/uL    MPV 9.4 9.0 - 12.9 fL    Neutrophils-Polys 75.90 (H) 44.00 - 72.00 %    Lymphocytes 16.40 (L) 22.00 - 41.00 %    Monocytes 4.40 0.00 - 13.40 %    Eosinophils 2.30 0.00 - 6.90 %    Basophils 0.30 0.00 - " 1.80 %    Immature Granulocytes 0.70 0.00 - 0.90 %    Nucleated RBC 0.00 /100 WBC    Neutrophils (Absolute) 9.21 (H) 1.82 - 7.42 K/uL    Lymphs (Absolute) 1.99 1.00 - 4.80 K/uL    Monos (Absolute) 0.53 0.00 - 0.85 K/uL    Eos (Absolute) 0.28 0.00 - 0.51 K/uL    Baso (Absolute) 0.04 0.00 - 0.12 K/uL    Immature Granulocytes (abs) 0.08 0.00 - 0.11 K/uL    NRBC (Absolute) 0.00 K/uL   Comp Metabolic Panel (CMP)    Collection Time: 04/02/22  6:02 AM   Result Value Ref Range    Sodium 134 (L) 135 - 145 mmol/L    Potassium 4.0 3.6 - 5.5 mmol/L    Chloride 100 96 - 112 mmol/L    Co2 23 20 - 33 mmol/L    Anion Gap 11.0 7.0 - 16.0    Glucose 142 (H) 65 - 99 mg/dL    Bun 19 8 - 22 mg/dL    Creatinine 0.89 0.50 - 1.40 mg/dL    Calcium 9.2 8.5 - 10.5 mg/dL    AST(SGOT) 26 12 - 45 U/L    ALT(SGPT) 15 2 - 50 U/L    Alkaline Phosphatase 139 (H) 30 - 99 U/L    Total Bilirubin 0.3 0.1 - 1.5 mg/dL    Albumin 2.8 (L) 3.2 - 4.9 g/dL    Total Protein 8.0 6.0 - 8.2 g/dL    Globulin 5.2 (H) 1.9 - 3.5 g/dL    A-G Ratio 0.5 g/dL   Vitamin D, 25-hydroxy (blood)    Collection Time: 04/02/22  6:02 AM   Result Value Ref Range    25-Hydroxy   Vitamin D 25 6 (L) 30 - 100 ng/mL   ESTIMATED GFR    Collection Time: 04/02/22  6:02 AM   Result Value Ref Range    GFR (CKD-EPI) 106 >60 mL/min/1.73 m 2   POCT glucose device results    Collection Time: 04/02/22  8:06 AM   Result Value Ref Range    POC Glucose, Blood 192 (H) 65 - 99 mg/dL   POCT glucose device results    Collection Time: 04/02/22 11:22 AM   Result Value Ref Range    POC Glucose, Blood 242 (H) 65 - 99 mg/dL       Current Facility-Administered Medications   Medication Frequency   • vitamin D2 (Ergocalciferol) (Drisdol) capsule 50,000 Units Q7 DAYS   • insulin regular (HumuLIN R,NovoLIN R) injection 4X/DAY ACHS    And   • dextrose 50% (D50W) injection 25 g Q15 MIN PRN   • Respiratory Therapy Consult Continuous RT   • Pharmacy Consult Request ...Pain Management Review 1 Each PHARMACY TO DOSE    • acetaminophen (Tylenol) tablet 650 mg Q4HRS PRN   • senna-docusate (PERICOLACE or SENOKOT S) 8.6-50 MG per tablet 2 Tablet BID    And   • polyethylene glycol/lytes (MIRALAX) PACKET 1 Packet QDAY PRN    And   • magnesium hydroxide (MILK OF MAGNESIA) suspension 30 mL QDAY PRN    And   • bisacodyl (DULCOLAX) suppository 10 mg QDAY PRN   • omeprazole (PRILOSEC) capsule 20 mg DAILY   • artificial tears ophthalmic solution 1 Drop PRN   • benzocaine-menthol (Cepacol) lozenge 1 Lozenge Q2HRS PRN   • mag hydrox-al hydrox-simeth (MAALOX PLUS ES or MYLANTA DS) suspension 20 mL Q2HRS PRN   • ondansetron (ZOFRAN ODT) dispertab 4 mg 4X/DAY PRN    Or   • ondansetron (ZOFRAN) syringe/vial injection 4 mg 4X/DAY PRN   • traZODone (DESYREL) tablet 50 mg QHS PRN   • sodium chloride (OCEAN) 0.65 % nasal spray 2 Spray PRN   • acetaminophen (Tylenol) tablet 650 mg Q6HRS    Followed by   • [START ON 4/6/2022] acetaminophen (Tylenol) tablet 650 mg Q6HRS PRN   • insulin GLARGINE (Lantus,Semglee) injection Q EVENING   • enoxaparin (LOVENOX) inj 40 mg DAILY   • lisinopril (PRINIVIL) tablet 20 mg Q DAY   • oxyCODONE immediate-release (ROXICODONE) tablet 5 mg Q4HRS PRN   • gabapentin (NEURONTIN) capsule 300 mg QHS       Orders Placed This Encounter   Procedures   • Diet Order Diet: Consistent CHO (Diabetic)     Standing Status:   Standing     Number of Occurrences:   1     Order Specific Question:   Diet:     Answer:   Consistent CHO (Diabetic) [4]       Assessment:  Active Hospital Problems    Diagnosis    • *S/P BKA (below knee amputation) unilateral, left (HCC)    • Vitamin D deficiency    • Thrombocytosis    • Alkaline phosphatase elevation    • Anemia    • Leukocytosis    • Hypertension    • Diabetic neuropathy associated with type 2 diabetes mellitus (HCC)    • DM (diabetes mellitus) (HCC)    • Peripheral arterial disease (HCC)        Medical Decision Making and Plan:    L BKA  PT/OT, 1.5 hr each discipline, 5 days per week    Pain  management  Scheduled tylenol  Scheduled gabapentin  As needed oxycodone, last use 4/2    Diabetes  Consult hospitalist    Hypertension  Lisinopril  Consult hospitalist    Leukocytosis  Check CXR, UA  Consult hospitalist    Vit D deficiency, 9  Start high dose supplementation    GI prophylaxis  Omeprazole    Bowel program  Continue bowel medications  Last BM 4/1 - 23    Bladder program  Check PVRs  Bladder scan for no voids  ICP for over 400 cc  Scheduled toileting          Total time:  35 minutes.  I spent greater than 50% of the time for patient care and coordination on this date, including unit/floor time, and face-to-face time with the patient as per assessment and plan above.    Dary Quinonez M.D.   Physical Medicine and Rehabilitation

## 2022-04-02 NOTE — THERAPY
Occupational Therapy   Initial Evaluation     Patient Name: Juan Lantigua  Age:  46 y.o., Sex:  male  Medical Record #: [id number]  Today's Date: 4/2/2022     Subjective    Pt sleeping upon arrival, agreeable to OT when woken. Pt initially declined shower stating he would prefer a male OT but was then agreeable after considering how long it has been since his last shower. Pt expressed appreciation at end of session.      Objective     04/02/22 0701   Prior Living Situation   Prior Services None;Home-Independent   Housing / Facility 3 Story House  (pt reports he will d/c to his friend's home which has a main floor bedroom and bathroom.)   Steps Into Home 3   Steps In Home   (2 flights of stairs, has main floor living)   Rail Unable To Determine At This Time   Elevator No   Bathroom Set up Bathtub / Shower Combination;Grab Bars  (tub combo in friend's home where he plans to d/c to. His bathroom at home has a WIS with no grab bars)   Equipment Owned None   Lives with - Patient's Self Care Capacity Spouse   Comments Pt plans to d/c to his friend's home which is detailed above. His home is a [facility] with 1 PONCE, a WIS with no grab bars.   Prior Level of ADL Function   Self Feeding Independent   Grooming / Hygiene Independent   Bathing Independent   Dressing Independent   Toileting Independent   Prior Level of IADL Function   Medication Management Independent   Laundry Independent   Kitchen Mobility Independent   Finances Independent   Home Management Independent   Shopping Independent   Prior Level Of Mobility Independent Without Device in Community   Driving / Transportation Unable To Determine At This Time   Occupation (Pre-Hospital Vocational) Not Employed   Prior Functioning: Everyday Activities   Self Care Independent   Indoor Mobility (Ambulation) Independent   Stairs Independent   Functional Cognition Independent   Prior Device Use None of the given options   Vitals   O2 Delivery Device None - Room Air   Pain  "  Intervention Nurse Notified;Repositioned   Pain 0 - 10 Group   Location Leg   Location Orientation Left   Pain Rating Scale (NPRS) 7   Description Aching;Constant   Comfort Goal Comfort with Movement;Perform Activity;Sleep Comfortably   Non Verbal Descriptors   Non Verbal Scale  Calm   Cognition    Level of Consciousness Alert   ABS (Agitated Behavior Scale)   Agitated Behavior Scale Performed No   Cognitive Pattern Assessment   Cognitive Pattern Assessment Used BIMS   Brief Interview for Mental Status (BIMS)   Repetition of Three Words (First Attempt) 3   Temporal Orientation: Year Correct   Temporal Orientation: Month Accurate within 5 days   Temporal Orientation: Day Correct   Recall: \"Sock\" Yes, no cue required   Recall: \"Blue\" Yes, no cue required   Recall: \"Bed\" No, could not recall   BIMS Summary Score 13   Vision Screen   Vision Not tested  (wears glasses. Reports he had increased blurriness when his blood sugars were uncontrolled but this has since resolved)   Passive ROM Upper Body   Passive ROM Upper Body WDL   Active ROM Upper Body   Active ROM Upper Body  WDL   Dominant Hand Ambidextrous   Strength Upper Body   Upper Body Strength  WDL   Sensation Upper Body   Upper Extremity Sensation  WDL   Comments No reported numbness or tingling   Upper Body Muscle Tone   Upper Body Muscle Tone  WDL   Balance Assessment   Sitting Balance (Static) Good   Sitting Balance (Dynamic) Good   Standing Balance (Static) Fair -   Standing Balance (Dynamic) Fair -   Weight Shift Sitting Good   Weight Shift Standing   (Unable, L BKA)   Bed Mobility    Supine to Sit Standby Assist   Sit to Supine   (NT - pt up in w/c at end of session)   Sit to Stand Contact Guard Assist   Scooting Standby Assist   Coordination Upper Body   Coordination WDL   Eating   Assistance Needed Independent   Physical Assistance Level No physical assistance   CARE Score - Eating 6   Eating Discharge Goal   Discharge Goal 6   Oral Hygiene   Assistance " Needed Set-up / clean-up   Physical Assistance Level No physical assistance   CARE Score - Oral Hygiene 5   Oral Hygiene Discharge Goal   Discharge Goal 6   Shower/Bathe Self   Assistance Needed Supervision   Physical Assistance Level No physical assistance   CARE Score - Shower/Bathe Self 4   Shower/Bathe Self Discharge Goal   Discharge Goal 6   Upper Body Dressing   Assistance Needed Supervision   Physical Assistance Level No physical assistance   CARE Score - Upper Body Dressing 4   Upper Body Dressing Discharge Goal   Discharge Goal 6   Lower Body Dressing   Assistance Needed Supervision   Physical Assistance Level No physical assistance   CARE Score - Lower Body Dressing 4   Lower Body Dressing Discharge Goal   Discharge Goal 6   Putting On/Taking Off Footwear   Assistance Needed Set-up / clean-up   Physical Assistance Level No physical assistance   CARE Score - Putting On/Taking Off Footwear 5   Putting On/Taking Off Footwear Discharge Goal   Discharge Goal 6   Toileting Hygiene   Assistance Needed Incidental touching   Physical Assistance Level No physical assistance   CARE Score - Toileting Hygiene 4   Toileting Hygiene Discharge Goal   Discharge Goal 6   Toilet Transfer   Assistance Needed Incidental touching   Physical Assistance Level No physical assistance   CARE Score - Toilet Transfer 4   Toilet Transfer Discharge Goal   Discharge Goal 6   Hearing, Speech, and Vision   Expression of Ideas and Wants Without difficulty   Understanding Verbal and Non-Verbal Content Understands   Functional Level of Assist   Eating Independent   Grooming Supervision;Seated   Grooming Description Initial preparation for task;Seated in wheelchair at sink   Bathing Supervision   Bathing Description Grab bar;Hand held shower;Tub bench;Initial preparation for task;Set up for wound protection   Upper Body Dressing Supervision   Upper Body Dressing Description Initial preparation for task   Lower Body Dressing Supervision  (stood  with FWW to pull pants up)   Lower Body Dressing Description Initial preparation for task;Set-up of equipment;Supervision for safety   Toileting Supervision   Toileting Description Initial preparation for task;Supervision for safety   Bed, Chair, Wheelchair Transfer Contact Guard Assist  (FWW)   Bed Chair Wheelchair Transfer Description Adaptive equipment   Toilet Transfers Contact Guard Assist  (ambulated from bedside with FWW)   Toilet Transfer Description Adaptive equipment;Increased time;Supervision for safety   Tub / Shower Transfers Contact Guard Assist  (Ambulated in with FWW, SPT to w/c after shower)   Tub Shower Transfer Description Shower bench;Increased time;Set-up of equipment;Supervision for safety   Problem List   Problem List Decreased Active Daily Living Skills;Decreased Homemaking Skills;Decreased Functional Mobility;Impaired Postural Control / Balance   Precautions   Precautions Fall Risk;Non Weight Bearing Left Lower Extremity   Current Discharge Plan   Current Discharge Plan Return to Prior Living Situation   Benefit    Therapy Benefit Patient Would Benefit from Inpatient Rehab Occupational Therapy to Maximize [place] with ADLs, IADLs and Functional Mobility.   Interdisciplinary Plan of Care Collaboration   IDT Collaboration with  Nursing;Physical Therapist   Patient Position at End of Therapy Seated;Chair Alarm On;Call Light within Reach;Tray Table within Reach;Phone within Reach   Collaboration Comments RN notified of pt's pain level, CLOF with PT   Equipment Needs   Assistive Device / DME Front-Wheel Walker;Wheelchair;Shower Chair;Grab Bars In Shower / Tub;Grab Bars By Toilet;Tub Transfer Bench   Adaptive Equipment None   Strengths & Barriers   Strengths Able to follow instructions;Adequate strength;Alert and oriented;Effective communication skills;Good insight into deficits/needs;Independent prior level of function;Manages pain appropriately;Motivated for self care and  independence;Pleasant and cooperative;Supportive family;Willingly participates in therapeutic activities   Barriers Impaired balance;Limited mobility;Pain   OT Total Time Spent   OT Individual Total Time Spent (Mins) 60   OT Charge Group   OT Self Care / ADL 1   OT Evaluation OT Evaluation Mod     Assessment  Patient is a 46 y.o. male with a diagnosis of L BKA performed on 3/27 after presenting to [facility] with gangrene, acute osteomyelitis and gas-forming infection of his left foot. Additional factors influencing patient status / progress (ie: cognitive factors, co-morbidities, social support, etc): Pt has a new diagnosis of uncontrolled diabetes. PMHx is unknown and has no primary care physician. Pt lives in [place] with his wife but reports he will stay at his cousin's house in [place] as it is more spacious. Reports IPLOF.     During OT eval, pt presented with impaired balance, pain, and limited mobility compared to baseline. Pt was able to ambulate into bathroom with FWW and CGA and perform transfers with CGA. Required overall supervision-CGA for ADLs as noted above. Pt is highly motivated and will benefit from inpatient occupational therapy services to maximize safety and independence in ADLs/IADLs and functional mobility and to determine needed equipment for d/c. Anticipate pt will continue to progress quickly.      Plan  Recommend Occupational Therapy  minutes per day 5-6 days per week for 7-10 days for the following treatments:  OT Group Therapy, OT Self Care/ADL, OT Manual Ther Technique, OT Neuro Re-Ed/Balance, OT Sensory Int Techniques, OT Therapeutic Activity and OT Therapeutic Exercise.    Passport items to be completed:  Perform bathroom transfers, complete dressing, complete feeding, get ready for the day, prepare a simple meal, participate in household tasks, adapt home for safety needs, demonstrate home exercise program, complete caregiver training     Goals:  Long term and short term goals  have been discussed with patient and they are in agreement.    Occupational Therapy Goals (Active)       Problem: Bathing       Dates: Start: 04/02/22         Goal: STG-Within one week, patient will bathe with modified independence.        Dates: Start: 04/02/22               Problem: Dressing       Dates: Start: 04/02/22         Goal: STG-Within one week, patient will dress LB with supervision and use of AE as needed.       Dates: Start: 04/02/22               Problem: Functional Transfers       Dates: Start: 04/02/22         Goal: STG-Within one week, patient will perform bathroom transfers with supervision and use of AE as needed.       Dates: Start: 04/02/22               Problem: OT Long Term Goals       Dates: Start: 04/02/22         Goal: LTG-By discharge, patient will complete basic self care tasks with modified independence and use of AE as needed.       Dates: Start: 04/02/22            Goal: LTG-By discharge, patient will perform bathroom transfers with modified independence and use of AE as needed.       Dates: Start: 04/02/22            Goal: LTG-By discharge, patient will complete basic home management with setup and supervision.        Dates: Start: 04/02/22               Problem: Toileting       Dates: Start: 04/02/22         Goal: STG-Within one week, patient will complete toileting tasks with supervision and use of AE as needed.       Dates: Start: 04/02/22

## 2022-04-02 NOTE — FLOWSHEET NOTE
04/01/22 1802   Patient History   Pulmonary Diagnosis None   Procedures Relevant to Respiratory Status None   Home O2 No   Nocturnal CPAP No   Home Treatments/Frequency No   Protocol Pathways   Protocol Pathways None

## 2022-04-02 NOTE — THERAPY
Occupational Therapy  Daily Treatment     Patient Name: Juan Lantigua  Age:  46 y.o., Sex:  male  Medical Record #: [id number]  Today's Date: 4/2/2022     Precautions  Precautions: (P) Fall Risk,Non Weight Bearing Left Lower Extremity         Subjective    Pt in bed upon arrival, agreeable to OT session.      Objective     04/02/22 1401   Precautions   Precautions Fall Risk;Non Weight Bearing Left Lower Extremity   Pain   Intervention Repositioned;Distraction   Pain 0 - 10 Group   Location Leg   Location Orientation Left   Pain Rating Scale (NPRS) 6   Description Aching   Comfort Goal Comfort with Movement;Perform Activity;Sleep Comfortably   Therapist Pain Assessment Prior to Activity   Non Verbal Descriptors   Non Verbal Scale  Calm   Functional Level of Assist   Bed, Chair, Wheelchair Transfer Standby Assist  (reach pivot EOB <> w/c)   Bed Chair Wheelchair Transfer Description Adaptive equipment;Supervision for safety   Sitting Upper Body Exercises   Chest Press 2 sets of 15;Bilateral;Weight (See Comments for lbs)  (6# dumbbell)   Front Arm Raise 2 sets of 15;Bilateral;Weight (See Comments for lbs)  (6# dumbbell)   Side Arm Raise 2 sets of 15;Bilateral;Weight (See Comments for lbs)  (6# dumbbell)   Shoulder Press 2 sets of 15;Bilateral;Weight (See Comments for lbs)  (6# dumbbell)   Bicep Curls 2 sets of 15;Bilateral;Weight (See Comments for lbs)  (6# dumbbell)   Wrist Flexion / Extension 2 sets of 15;Bilateral;Weight (See Comments for lbs)  (6# dumbbell)   Bed Mobility    Supine to Sit Standby Assist   Sit to Supine Standby Assist   Interdisciplinary Plan of Care Collaboration   Patient Position at End of Therapy In Bed;Call Light within Reach;Tray Table within Reach;Phone within Reach   OT Total Time Spent   OT Individual Total Time Spent (Mins) 30   OT Charge Group   OT Therapeutic Exercise  2     Reviewed Passport to [place] program.     Assessment    Pt tolerated OT session well focused on above UE exercises  to increase strength for transfers and functional mobility with FWW. Pt reports he exercises on his own with heavier weights than a 6# dumbbell (reported he uses up to 35#) so pt performed extra repetitions. Also instructed pt on wheelchair pushups that he can perform on his own in his room.     Strengths: Able to follow instructions,Adequate strength,Alert and oriented,Effective communication skills,Good insight into deficits/needs,Independent prior level of function,Manages pain appropriately,Motivated for self care and independence,Pleasant and cooperative,Supportive family,Willingly participates in therapeutic activities  Barriers: Impaired balance,Limited mobility,Pain    Plan    ADLs and IADLs, related functional mobility, transfers, standing balance, strength/endurance, mirror therapy for phantom limb pain mgmt     Passport items to be completed:  [unfilled]    Occupational Therapy Goals (Active)       Problem: Bathing       Dates: Start: 04/02/22         Goal: STG-Within one week, patient will bathe with modified independence.        Dates: Start: 04/02/22               Problem: Dressing       Dates: Start: 04/02/22         Goal: STG-Within one week, patient will dress LB with supervision and use of AE as needed.       Dates: Start: 04/02/22               Problem: Functional Transfers       Dates: Start: 04/02/22         Goal: STG-Within one week, patient will perform bathroom transfers with supervision and use of AE as needed.       Dates: Start: 04/02/22               Problem: OT Long Term Goals       Dates: Start: 04/02/22         Goal: LTG-By discharge, patient will complete basic self care tasks with modified independence and use of AE as needed.       Dates: Start: 04/02/22            Goal: LTG-By discharge, patient will perform bathroom transfers with modified independence and use of AE as needed.       Dates: Start: 04/02/22            Goal: LTG-By discharge, patient will complete basic home  management with setup and supervision.        Dates: Start: 04/02/22               Problem: Toileting       Dates: Start: 04/02/22         Goal: STG-Within one week, patient will complete toileting tasks with supervision and use of AE as needed.       Dates: Start: 04/02/22

## 2022-04-02 NOTE — CARE PLAN
The patient is Stable - Low risk of patient condition declining or worsening    Shift Goals  Clinical Goals: safety, orientation to rehab  Patient Goals: pain control, sleep well    Problem: Psychosocial  Goal: Patient and family will demonstrate ability to cope with life altering diagnosis and/or procedure  Outcome: Progressing. Patient in good spirits this shift, he denies any needs at this time. Support provided and education provided regarding questions of Rehab routines.      Problem: Skin Integrity  Goal: Patient's skin integrity will be maintained or improve  Outcome: Progressing. Patient's left BKA clean, dry, intact with sutures. Wrapped with dry gauze and ace wrap. LDA opened in flowsheets with updated picture.

## 2022-04-02 NOTE — ASSESSMENT & PLAN NOTE
HbA1c remains pending  Observe serum glucose trends on Glargine and SSI  Outpt meds include Lantus 20 u qam and 10 u qhs

## 2022-04-02 NOTE — ASSESSMENT & PLAN NOTE
Completed Unasyn and Vancomycin for sepsis per ID at [facility]  On scheduled APAP per Physiatry  Suspect resolving infxn  PCT normal  ESR and CRP elevated, follow both  UA 0-2 WBC w/ negative bacteria  CXR negative acute  WBC resolved spontaneously w/o intervention

## 2022-04-02 NOTE — ASSESSMENT & PLAN NOTE
Left foot OM S/P L BKA on 3/27/22 by Dr. Larkin at [facility]  Wound care and pain control per Physiatry  Recommend close monitoring for wound healing

## 2022-04-02 NOTE — THERAPY
Physical Therapy   Initial Evaluation     Patient Name: Juan Lantigua  Age:  46 y.o., Sex:  male  Medical Record #: [id number]  Today's Date: 4/2/2022     Subjective    I just want to do everything I am supposed to do. My leg is throbbing.     Objective       04/02/22 1231   Prior Living Situation   Prior Services None;Home-Independent   Housing / Facility 3 Story House  (can live on first floor)   Steps Into Home 3  (cousin building a ramp)   Elevator No   Equipment Owned None   Lives with - Patient's Self Care Capacity Spouse   Comments Plans to DC to cousin's house in [place] with wife as caregiver.   Prior Functioning: Everyday Activities   Self Care Independent   Indoor Mobility (Ambulation) Independent   Stairs Independent   Functional Cognition Independent   Prior Device Use None of the given options   Pain   Intervention Medication (see MAR)   Pain 0 - 10 Group   Location Leg   Location Orientation Left   Pain Rating Scale (NPRS) 6   Description Aching;Throbbing   Comfort Goal Comfort with Movement   Therapist Pain Assessment Prior to Activity;During Activity   Non Verbal Descriptors   Non Verbal Scale  Calm   Cognition    Level of Consciousness Alert   ABS (Agitated Behavior Scale)   Agitated Behavior Scale Performed Yes   Short Attention Span, Easy Distractibility, Inability to Concentrate 1   Impulsive, Impatient, Low Tolerance for Pain or Frustration 1   Uncooperative, Resistant to Care, Demanding 1   Violent and/or Threatening Violence Toward People or Property 1   Explosive and/or Unpredictable Anger 1   Rocking, Rubbing, Moaning, Other Self-Stimulating Behavior 1   Pulling at Tubes, Restraints, etc. 1   Wandering from Treatment Area 1   Restlessness, Pacing, Excessive Movement 1   Repetitive Behaviors, Motor and/or Verbal 1   Rapid, Loud or Excessive Talking 1   Sudden Changes of Mood 1   Easily Initiated - Excessive Crying and/or Laughter 1   Self-Abusiveness, Physical and/or Verbal 1   Agitated Behavior  Scale Total Score 14   Level of Severity No Agitation   Passive ROM Lower Body   Passive ROM Lower Body WDL   Comments L BKA   Active ROM Lower Body    Active ROM Lower Body  WDL   Comments L BKA   Strength Lower Body   Lower Body Strength  WDL   Comments L 5/5, R hip, knee >3/5- no formally tested due to pain; observed during exercises   Sensation Lower Body   Comments phantom pain L LE   Balance Assessment   Sitting Balance (Static) Good   Sitting Balance (Dynamic) Good   Standing Balance (Static) Fair   Standing Balance (Dynamic) Fair   Weight Shift Sitting Good   Weight Shift Standing Fair   Bed Mobility    Supine to Sit Standby Assist   Sit to Supine Contact Guard Assist   Sit to Stand Contact Guard Assist   Scooting Standby Assist   Rolling Standby Assist   Roll Left and Right   Assistance Needed Adaptive equipment   Physical Assistance Level No physical assistance   CARE Score - Roll Left and Right 6   Roll Left and Right Discharge Goal   Discharge Goal 6   Sit to Lying   Assistance Needed Adaptive equipment;Supervision   Physical Assistance Level No physical assistance   CARE Score - Sit to Lying 4   Sit to Lying Discharge Goal   Discharge Goal 6   Lying to Sitting on Side of Bed   Assistance Needed Adaptive equipment;Supervision   Physical Assistance Level No physical assistance   CARE Score - Lying to Sitting on Side of Bed 4   Lying to Sitting on Side of Bed Discharge Goal   Discharge Goal 6   Sit to Stand   Assistance Needed Adaptive equipment;Physical assistance   Physical Assistance Level 25% or less   CARE Score - Sit to Stand 3   Sit to Stand Discharge Goal   Discharge Goal 6   Chair/Bed-to-Chair Transfer   Assistance Needed Adaptive equipment;Supervision;Verbal cues;Incidental touching   CARE Score - Chair/Bed-to-Chair Transfer 4   Chair/Bed-to-Chair Transfer Discharge Goal   Discharge Goal 6   Toilet Transfer   Assistance Needed Incidental touching;Adaptive equipment   Physical Assistance Level No  physical assistance   CARE Score - Toilet Transfer 4   Toilet Transfer Discharge Goal   Discharge Goal 6   Car Transfer   Reason if not Attempted Environmental limitations  (isolation precautions)   CARE Score - Car Transfer 10   Car Transfer Discharge Goal   Discharge Goal 4   Walk 10 Feet   Assistance Needed Adaptive equipment;Incidental touching;Supervision   CARE Score - Walk 10 Feet 4   Walk 10 Feet Discharge Goal   Discharge Goal 6   Walk 50 Feet with Two Turns   Assistance Needed Adaptive equipment;Incidental touching   Physical Assistance Level No physical assistance   CARE Score - Walk 50 Feet with Two Turns 4   Walk 50 Feet with Two Turns Discharge Goal   Discharge Goal 6   Walk 150 Feet   Reason if not Attempted Medical concerns  (pain in residual limb)   CARE Score - Walk 150 Feet 88   Walk 150 Feet Discharge Goal   Discharge Goal 6   Walking 10 Feet on Uneven Surfaces   Reason if not Attempted Environmental limitations   CARE Score - Walking 10 Feet on Uneven Surfaces 10   Walking 10 Feet on Uneven Surfaces Discharge Goal   Discharge Goal 6   1 Step (Curb)   Assistance Needed Adaptive equipment;Physical assistance   Physical Assistance Level 25% or less   CARE Score - 1 Step (Curb) 3   1 Step (Curb) Discharge Goal   Discharge Goal 6   4 Steps   Reason if not Attempted Environmental limitations   CARE Score - 4 Steps 10   4 Steps Discharge Goal   Discharge Goal 4   12 Steps   Reason if not Attempted Environmental limitations   CARE Score - 12 Steps 10   12 Steps Discharge Goal   Discharge Goal 4   Picking Up Object   Assistance Needed Adaptive equipment;Incidental touching   CARE Score - Picking Up Object 4   Picking Up Object Discharge Goal   Discharge Goal 6   Wheel 50 Feet with Two Turns   Assistance Needed Adaptive equipment;Supervision;Verbal cues   CARE Score - Wheel 50 Feet with Two Turns 4   Type of Wheelchair/Scooter Manual   Wheel 50 Feet with Two Turns Discharge Goal   Discharge Goal 6   Wheel  "150 Feet   Assistance Needed Adaptive equipment;Supervision;Verbal cues   CARE Score - Wheel 150 Feet 4   Type of Wheelchair/Scooter Manual   Wheel 150 Feet Discharge Goal   Discharge Goal 6   Gait Functional Level of Assist    Gait Level Of Assist Contact Guard Assist   Assistive Device Front Wheel Walker   Distance (Feet) 120  (plus 75)   # of Times Distance was Traveled 1   Deviation Step To;Bradykinetic   Wheelchair Functional Level of Assist   Wheelchair Assist Stand by Assist   Distance Wheelchair (Feet or Distance) 150   Wheelchair Description Supervision for safety;Verbal cueing;Leg rest management   Stairs Functional Level of Assist   Level of Assist with Stairs Contact Guard Assist   # of Stairs Climbed 2  (4\" step- forward and backward)   Stairs Description Walker;Verbal cueing;Supervision for safety   Transfer Functional Level of Assist   Bed, Chair, Wheelchair Transfer Standby Assist  (reach pivot WC to EOB)   Bed Chair Wheelchair Transfer Description Adaptive equipment;Supervision for safety   Precautions   Precautions Fall Risk;Non Weight Bearing Left Lower Extremity   Current Discharge Plan   Current Discharge Plan Return to Prior Living Situation   Interdisciplinary Plan of Care Collaboration   IDT Collaboration with  Nursing;Occupational Therapist   Patient Position at End of Therapy In Bed;Call Light within Reach;Tray Table within Reach;Phone within Reach   Collaboration Comments CLOF, request for pain medication   Strengths & Barriers   Strengths Able to follow instructions;Adequate strength;Alert and oriented;Effective communication skills;Independent prior level of function;Motivated for self care and independence;Pleasant and cooperative;Willingly participates in therapeutic activities   Barriers Limited mobility;Pain;Fatigue   PT Total Time Spent   PT Individual Total Time Spent (Mins) 60   PT Charge Group   PT Therapeutic Activities 1   PT Evaluation PT Evaluation Low     POC and goals " reviewed with patient- patient wants to be able to get back to regular routine. Agreeable to  for community distances. Interested in hearing about prosthetic process. Reiterated importance of calling for assistance to get OOB at this time due to high pain level and new BKA.    Handout provided for stretching for BKA in supine and sitting.    Assessment  Patient is 46 y.o. male with a diagnosis of s/p L BKA after severe L foot infection resulting in sepsis, osteomyelitis and gangrene. Additional factors influencing patient status / progress (ie: cognitive factors, co-morbidities, social support, etc): Patient has uncontrolled DM, HTN. Patient has supportive family and very motivated to be discharged home as soon as possible.  Patient already at [facility] level for in room gait with FWW.  Pain limiting factor with OOB tolerance.    Plan  Recommend Physical Therapy  minutes per day 5-6 days per week for 7-10 days for the following treatments:  PT Group Therapy, PT E Stim Attended, PT Prosthetic Training, PT Gait Training, PT Self Care/Home Eval, PT Therapeutic Exercises, PT Neuro Re-Ed/Balance, PT Therapeutic Activity, and PT Evaluation.    Passport items to be completed:  Get in/out of bed safely, in/out of a vehicle, safely use mobility device, walk or wheel around home/community, navigate up and down stairs, show how to get up/down from the ground, ensure home is accessible, demonstrate HEP, complete caregiver training    Goals:  Long term and short term goals have been discussed with patient and they are in agreement.    Physical Therapy Problems (Active)       Problem: Balance       Dates: Start: 04/02/22         Goal: STG-Within one week, patient will maintain dynamic standing x 5 min with FWW, SBA       Dates: Start: 04/02/22               Problem: Mobility       Dates: Start: 04/02/22         Goal: STG-Within one week, patient will propel wheelchair community distances  Lucinda over indoor surfaces, supervised  outdoor surfaces, >300ft       Dates: Start: 04/02/22            Goal: STG-Within one week, patient will ambulate household distance with FWW, SBA, 150ft.       Dates: Start: 04/02/22            Goal: STG-Within one week, patient will ambulate up/down a curb with FWW, SBA       Dates: Start: 04/02/22               Problem: Mobility Transfers       Dates: Start: 04/02/22         Goal: STG-Within one week, patient will transfer bed to chair supervised with LRAD       Dates: Start: 04/02/22               Problem: PT-Long Term Goals       Dates: Start: 04/02/22         Goal: LTG-By discharge, patient will tolerate standing x 10 minutes with FWW, Lucinda, pain <3/10       Dates: Start: 04/02/22            Goal: LTG-By discharge, patient will ambulate x 200 ft with FWW, Lucinda       Dates: Start: 04/02/22            Goal: LTG-By discharge, patient will transfer one surface to another Lucinda with LRAD       Dates: Start: 04/02/22            Goal: LTG-By discharge, patient will perform home exercise program Lucinda for stretching/strengthening L residual limb       Dates: Start: 04/02/22            Goal: LTG-By discharge, patient will ambulate up/down 4-6 stairs, SBA with LRAD/railings.       Dates: Start: 04/02/22

## 2022-04-02 NOTE — CONSULTS
HOSPITAL MEDICINE CONSULTATION    Requesting Physician:  Dr. Quinonez    Reason for Consult:  Leukocytosis, Hypertension, Diabetes    History of Present Illness:  The patient is a 46-year-old male with no significant past medical history due to lack of regular medical care.  He was admitted to [facility] on 3/26/22 for left foot gangrene with gas forming infection.  The patient was diagnosed with osteomyelitis with sepsis syndrome, and placed on Unasyn and Vancomycin.  He underwent left below knee amputation on 3/27/22 by Dr. Larkin, and subsequently taken off of intravenous antimicrobial therapy per Infectious Diseases.  Due to his ongoing functional debility, the patient was transferred to [facility] on 4/1/22.  Hospital Medicine consultation is requested to assist in the management of this patient's leukocytosis and recently diagnosed HTN and DM.  He is also noted to have anemia, thrombocytosis, and elevated alkaline phosphatase levels.    Review of Systems:  Review of Systems   Constitutional: Negative for chills, fever and malaise/fatigue.   HENT: Negative.    Eyes: Negative.    Respiratory: Negative for cough and shortness of breath.    Cardiovascular: Negative for chest pain and palpitations.   Gastrointestinal: Negative for abdominal pain, nausea and vomiting.   Genitourinary: Negative.    Musculoskeletal:        Wound pain    Skin: Negative for itching and rash.   Neurological: Positive for weakness.   Endo/Heme/Allergies: Negative for polydipsia. Does not bruise/bleed easily.   All other systems reviewed and are negative.      Allergies:  Allergies   Allergen Reactions   • Amitriptyline    • Metformin        Medications:    Current Facility-Administered Medications:   •  vitamin D2 (Ergocalciferol) (Drisdol) capsule 50,000 Units, 50,000 Units, Oral, Q7 DAYS, Dary Quinonez M.D., 50,000 Units at 04/02/22 1122  •  insulin regular (HumuLIN R,NovoLIN R)  injection, 2-12 Units, Subcutaneous, 4X/DAY ACHS **AND** POC blood glucose manual result, , , Q AC AND BEDTIME(S) **AND** NOTIFY MD and PharmD, , , Once **AND** Administer 20 grams of glucose (approximately 8 ounces of fruit juice) every 15 minutes PRN FSBG less than 70 mg/dL, , , PRN **AND** dextrose 50% (D50W) injection 25 g, 25 g, Intravenous, Q15 MIN PRN, Chela Valles M.D.  •  Respiratory Therapy Consult, , Nebulization, Continuous RT, Juanita Friedman D.O.  •  Pharmacy Consult Request ...Pain Management Review 1 Each, 1 Each, Other, PHARMACY TO DOSE, Juanita Friedman D.O.  •  acetaminophen (Tylenol) tablet 650 mg, 650 mg, Oral, Q4HRS PRN, CLARA BrownO.  •  senna-docusate (PERICOLACE or SENOKOT S) 8.6-50 MG per tablet 2 Tablet, 2 Tablet, Oral, BID, 2 Tablet at 04/02/22 0809 **AND** polyethylene glycol/lytes (MIRALAX) PACKET 1 Packet, 1 Packet, Oral, QDAY PRN **AND** magnesium hydroxide (MILK OF MAGNESIA) suspension 30 mL, 30 mL, Oral, QDAY PRN **AND** bisacodyl (DULCOLAX) suppository 10 mg, 10 mg, Rectal, QDAY PRN, KIMMY Brown.O.  •  omeprazole (PRILOSEC) capsule 20 mg, 20 mg, Oral, DAILY, KIMMY Brown.O., 20 mg at 04/02/22 0809  •  artificial tears ophthalmic solution 1 Drop, 1 Drop, Both Eyes, PRN, KIMMY Brown.O.  •  benzocaine-menthol (Cepacol) lozenge 1 Lozenge, 1 Lozenge, Mouth/Throat, Q2HRS PRN, KIMMY Brown.O.  •  mag hydrox-al hydrox-simeth (MAALOX PLUS ES or MYLANTA DS) suspension 20 mL, 20 mL, Oral, Q2HRS PRN, Juanita Friedman D.O.  •  ondansetron (ZOFRAN ODT) dispertab 4 mg, 4 mg, Oral, 4X/DAY PRN **OR** ondansetron (ZOFRAN) syringe/vial injection 4 mg, 4 mg, Intramuscular, 4X/DAY PRN, Juanita Friedman D.O.  •  traZODone (DESYREL) tablet 50 mg, 50 mg, Oral, QHS PRN, Juanita Friedman D.O.  •  sodium chloride (OCEAN) 0.65 % nasal spray 2 Spray, 2 Spray, Nasal, PRN, CLARA BrownO.  •  acetaminophen (Tylenol) tablet 650 mg, 650 mg, Oral, Q6HRS, 650 mg at 04/02/22 1127 **FOLLOWED  BY** [START ON 4/6/2022] acetaminophen (Tylenol) tablet 650 mg, 650 mg, Oral, Q6HRS PRN, KIMMY Brown.ELIUD.  •  insulin GLARGINE (Lantus,Semglee) injection, 10 Units, Subcutaneous, Q EVENING, CLARA BrownO., 10 Units at 04/01/22 2120  •  enoxaparin (LOVENOX) inj 40 mg, 40 mg, Subcutaneous, DAILY, KIMMY Brown.O., 40 mg at 04/01/22 1721  •  lisinopril (PRINIVIL) tablet 20 mg, 20 mg, Oral, Q DAY, KIMMY Brown.ELIUD.  •  oxyCODONE immediate-release (ROXICODONE) tablet 5 mg, 5 mg, Oral, Q4HRS PRN, KIMMY Brown.O., 5 mg at 04/02/22 0459  •  gabapentin (NEURONTIN) capsule 300 mg, 300 mg, Oral, QHS, KIMMY Brown.O., 300 mg at 04/01/22 2118    Past Medical/Surgical History:  Past Medical History:   Diagnosis Date   • Broken arm 2012    R     No past surgical history on file.    Social History:  Social History     Socioeconomic History   • Marital status:      Spouse name: Not on file   • Number of children: 5   • Years of education: Not on file   • Highest education level: Not on file   Occupational History   • Occupation: unemployed    Tobacco Use   • Smoking status: Current Every Day Smoker     Packs/day: 0.50     Years: 26.00     Pack years: 13.00   • Smokeless tobacco: Never Used   Substance and Sexual Activity   • Alcohol use: Yes     Alcohol/week: 1.2 oz     Types: 2 Cans of beer per week   • Drug use: Yes     Frequency: 7.0 times per week     Types: Marijuana   • Sexual activity: Yes     Partners: Female     Birth control/protection: Condom   Other Topics Concern   • Not on file   Social History Narrative   • Not on file     Social Determinants of Health     Financial Resource Strain: Not on file   Food Insecurity: Not on file   Transportation Needs: Not on file   Physical Activity: Not on file   Stress: Not on file   Social Connections: Not on file   Intimate Partner Violence: Not on file   Housing Stability: Not on file       Family History:  Family History   Problem Relation Age of  Onset   • Diabetes Father    • Heart Failure Father        Physical Examination:   Vitals:    04/01/22 1801 04/01/22 1842 04/02/22 0500 04/02/22 0715   BP:  125/67 119/76 118/68   Pulse: 93 93 95 (!) 102   Resp: 16 18  18   Temp:  36.6 °C (97.9 °F)  36.7 °C (98 °F)   TempSrc:  Oral     SpO2: 98% 98%  98%   Weight:       Height:           Physical Exam  Vitals reviewed.   Constitutional:       General: He is not in acute distress.     Appearance: He is not ill-appearing, toxic-appearing or diaphoretic.   HENT:      Head: Normocephalic and atraumatic.      Right Ear: External ear normal.      Left Ear: External ear normal.      Nose: Nose normal.      Mouth/Throat:      Pharynx: Oropharynx is clear.   Eyes:      General:         Right eye: No discharge.         Left eye: No discharge.      Extraocular Movements: Extraocular movements intact.      Conjunctiva/sclera: Conjunctivae normal.   Cardiovascular:      Rate and Rhythm: Normal rate and regular rhythm.   Pulmonary:      Effort: Pulmonary effort is normal. No respiratory distress.      Breath sounds: Normal breath sounds. No wheezing.   Abdominal:      General: Bowel sounds are normal. There is no distension.      Palpations: Abdomen is soft.      Tenderness: There is no abdominal tenderness. There is no guarding or rebound.   Musculoskeletal:      Cervical back: Normal range of motion and neck supple.      Right lower leg: No edema.      Comments: Wound photo 4/1/22:  L BKA incision clean and dry w/ intact sutures and lj-incisional dusky erythema w/o induration   Skin:     General: Skin is warm and dry.   Neurological:      Comments: Somnolent but easily arousable and appropriate         Laboratory Data:  Recent Labs     04/02/22  0602   WBC 12.1*   RBC 3.68*   HEMOGLOBIN 10.8*   HEMATOCRIT 32.3*   MCV 87.8   MCH 29.3   MCHC 33.4*   RDW 42.4   PLATELETCT 453*   MPV 9.4     Recent Labs     04/02/22  0602   SODIUM 134*   POTASSIUM 4.0   CHLORIDE 100   CO2 23    GLUCOSE 142*   BUN 19   CREATININE 0.89   CALCIUM 9.2         Impressions/Recommendations:  DM (diabetes mellitus) (HCC)  HbA1c pending  On Glargine and SSI  Will increase SSI  Observe serum glucose trends for now    S/P BKA (below knee amputation) unilateral, left (HCC)  Left foot OM S/P L BKA on 3/27/22 by Dr. Larkin at [facility]  Wound care and pain control per Physiatry    Hypertension  Observe blood pressure trends on Lisinopril    Vitamin D deficiency  Vit D level 6  On high dose supplementation per Physiatry    Thrombocytosis  Likely reactive  Follow Platelet counts    Alkaline phosphatase elevation  Likely 2/2 recent BKA  Consider further work up if persists, worsens, or develops GI symptoms  Follow Alk Phos levels to resolution    Anemia  Has normocytic indices  Check Fe Panel  Follow H/H    Leukocytosis  Completed Unasyn and Vancomycin for sepsis per ID at [facility]  On scheduled APAP per Physiatry  Suspect resolving infxn  But agree w/ checking UA and CXR  Follow WBC to resolution    Full Code    Discussed with Dr. Quinonez.  Thank you for the opportunity to assist in this patient's care.  We will continue to follow along with you.

## 2022-04-02 NOTE — FLOWSHEET NOTE
04/01/22 1801   Events/Summary/Plan   Events/Summary/Plan RT assessment   Vital Signs   Pulse 93   Respiration 16   Pulse Oximetry 98 %   $ Pulse Oximetry (Spot Check) Yes   Respiratory Assessment   Respiratory Pattern Within Normal Limits   Level of Consciousness Alert   Chest Exam   Work Of Breathing / Effort Within Normal Limits   Breath Sounds   RUL Breath Sounds Clear   RML Breath Sounds Clear;Diminished   RLL Breath Sounds Crackles;Diminished   MANOHAR Breath Sounds Clear   LLL Breath Sounds Crackles;Diminished   Secretions   Cough Non Productive   Oxygen   O2 Delivery Device None - Room Air   Smoking History   Have you ever smoked Yes   Have you smoked in the last 12 months Yes   Have you quit smoking No   Do you have any pipes/cigarettes/lighter/matches/etc in your possesion No   Smoking Cessation Offered Patient Counseled

## 2022-04-02 NOTE — CARE PLAN
Problem: Bowel Elimination  Goal: Patient will participate in bowel management program  Outcome: Progressing  Note: Patient continent of bowels.  Will continue to monitor.    The patient is Stable - Low risk of patient condition declining or worsening    Shift Goals  Clinical Goals: safety, orientation to rehab  Patient Goals: pain control, sleep well    Progress made toward(s) clinical / shift goals:  pt continent of bowel    Patient is not progressing towards the following goals:

## 2022-04-02 NOTE — ASSESSMENT & PLAN NOTE
Likely 2/2 recent BKA  Consider further work up if persists, worsens, or develops GI symptoms  Follow Alk Phos levels to resolution

## 2022-04-03 LAB
ALBUMIN SERPL BCP-MCNC: 2.7 G/DL (ref 3.2–4.9)
ALBUMIN/GLOB SERPL: 0.6 G/DL
ALP SERPL-CCNC: 130 U/L (ref 30–99)
ALT SERPL-CCNC: 12 U/L (ref 2–50)
ANION GAP SERPL CALC-SCNC: 11 MMOL/L (ref 7–16)
AST SERPL-CCNC: 19 U/L (ref 12–45)
BILIRUB SERPL-MCNC: 0.3 MG/DL (ref 0.1–1.5)
BUN SERPL-MCNC: 18 MG/DL (ref 8–22)
CALCIUM SERPL-MCNC: 9 MG/DL (ref 8.5–10.5)
CHLORIDE SERPL-SCNC: 104 MMOL/L (ref 96–112)
CO2 SERPL-SCNC: 24 MMOL/L (ref 20–33)
CREAT SERPL-MCNC: 0.79 MG/DL (ref 0.5–1.4)
CRP SERPL HS-MCNC: 2.97 MG/DL (ref 0–0.75)
ERYTHROCYTE [DISTWIDTH] IN BLOOD BY AUTOMATED COUNT: 43.2 FL (ref 35.9–50)
ERYTHROCYTE [SEDIMENTATION RATE] IN BLOOD BY WESTERGREN METHOD: 117 MM/HOUR (ref 0–20)
GFR SERPLBLD CREATININE-BSD FMLA CKD-EPI: 110 ML/MIN/1.73 M 2
GLOBULIN SER CALC-MCNC: 4.8 G/DL (ref 1.9–3.5)
GLUCOSE BLD STRIP.AUTO-MCNC: 131 MG/DL (ref 65–99)
GLUCOSE BLD STRIP.AUTO-MCNC: 187 MG/DL (ref 65–99)
GLUCOSE BLD STRIP.AUTO-MCNC: 191 MG/DL (ref 65–99)
GLUCOSE BLD STRIP.AUTO-MCNC: 196 MG/DL (ref 65–99)
GLUCOSE SERPL-MCNC: 127 MG/DL (ref 65–99)
HCT VFR BLD AUTO: 31.5 % (ref 42–52)
HGB BLD-MCNC: 10.4 G/DL (ref 14–18)
IRON SATN MFR SERPL: 15 % (ref 15–55)
IRON SERPL-MCNC: 29 UG/DL (ref 50–180)
MCH RBC QN AUTO: 29.1 PG (ref 27–33)
MCHC RBC AUTO-ENTMCNC: 33 G/DL (ref 33.7–35.3)
MCV RBC AUTO: 88.2 FL (ref 81.4–97.8)
PLATELET # BLD AUTO: 449 K/UL (ref 164–446)
PMV BLD AUTO: 9.4 FL (ref 9–12.9)
POTASSIUM SERPL-SCNC: 4 MMOL/L (ref 3.6–5.5)
PROCALCITONIN SERPL-MCNC: 0.06 NG/ML
PROT SERPL-MCNC: 7.5 G/DL (ref 6–8.2)
RBC # BLD AUTO: 3.57 M/UL (ref 4.7–6.1)
SODIUM SERPL-SCNC: 139 MMOL/L (ref 135–145)
TIBC SERPL-MCNC: 193 UG/DL (ref 250–450)
UIBC SERPL-MCNC: 164 UG/DL (ref 110–370)
WBC # BLD AUTO: 10.5 K/UL (ref 4.8–10.8)

## 2022-04-03 PROCEDURE — 770010 HCHG ROOM/CARE - REHAB SEMI PRIVAT*

## 2022-04-03 PROCEDURE — 97530 THERAPEUTIC ACTIVITIES: CPT | Mod: CO

## 2022-04-03 PROCEDURE — 80053 COMPREHEN METABOLIC PANEL: CPT

## 2022-04-03 PROCEDURE — 83550 IRON BINDING TEST: CPT

## 2022-04-03 PROCEDURE — 97110 THERAPEUTIC EXERCISES: CPT | Mod: CO

## 2022-04-03 PROCEDURE — 700102 HCHG RX REV CODE 250 W/ 637 OVERRIDE(OP): Performed by: PHYSICAL MEDICINE & REHABILITATION

## 2022-04-03 PROCEDURE — 85027 COMPLETE CBC AUTOMATED: CPT

## 2022-04-03 PROCEDURE — 97530 THERAPEUTIC ACTIVITIES: CPT

## 2022-04-03 PROCEDURE — 97116 GAIT TRAINING THERAPY: CPT

## 2022-04-03 PROCEDURE — 83540 ASSAY OF IRON: CPT

## 2022-04-03 PROCEDURE — 86140 C-REACTIVE PROTEIN: CPT

## 2022-04-03 PROCEDURE — 700111 HCHG RX REV CODE 636 W/ 250 OVERRIDE (IP): Performed by: PHYSICAL MEDICINE & REHABILITATION

## 2022-04-03 PROCEDURE — 99232 SBSQ HOSP IP/OBS MODERATE 35: CPT | Performed by: HOSPITALIST

## 2022-04-03 PROCEDURE — A9270 NON-COVERED ITEM OR SERVICE: HCPCS | Performed by: PHYSICAL MEDICINE & REHABILITATION

## 2022-04-03 PROCEDURE — 84145 PROCALCITONIN (PCT): CPT

## 2022-04-03 PROCEDURE — 82962 GLUCOSE BLOOD TEST: CPT

## 2022-04-03 PROCEDURE — 97110 THERAPEUTIC EXERCISES: CPT

## 2022-04-03 PROCEDURE — 85652 RBC SED RATE AUTOMATED: CPT

## 2022-04-03 PROCEDURE — 36415 COLL VENOUS BLD VENIPUNCTURE: CPT

## 2022-04-03 RX ORDER — LISINOPRIL 5 MG/1
10 TABLET ORAL
Status: DISCONTINUED | OUTPATIENT
Start: 2022-04-04 | End: 2022-04-05 | Stop reason: HOSPADM

## 2022-04-03 RX ADMIN — ACETAMINOPHEN 650 MG: 325 TABLET ORAL at 05:36

## 2022-04-03 RX ADMIN — OXYCODONE 5 MG: 5 TABLET ORAL at 17:28

## 2022-04-03 RX ADMIN — OXYCODONE 5 MG: 5 TABLET ORAL at 21:49

## 2022-04-03 RX ADMIN — SENNOSIDES AND DOCUSATE SODIUM 2 TABLET: 50; 8.6 TABLET ORAL at 09:11

## 2022-04-03 RX ADMIN — ACETAMINOPHEN 650 MG: 325 TABLET ORAL at 17:28

## 2022-04-03 RX ADMIN — OXYCODONE 5 MG: 5 TABLET ORAL at 04:00

## 2022-04-03 RX ADMIN — ACETAMINOPHEN 650 MG: 325 TABLET ORAL at 11:26

## 2022-04-03 RX ADMIN — GABAPENTIN 300 MG: 300 CAPSULE ORAL at 20:26

## 2022-04-03 RX ADMIN — ENOXAPARIN SODIUM 40 MG: 40 INJECTION SUBCUTANEOUS at 17:28

## 2022-04-03 RX ADMIN — OXYCODONE 5 MG: 5 TABLET ORAL at 09:11

## 2022-04-03 RX ADMIN — LISINOPRIL 20 MG: 20 TABLET ORAL at 05:36

## 2022-04-03 RX ADMIN — OMEPRAZOLE 20 MG: 20 CAPSULE, DELAYED RELEASE ORAL at 09:11

## 2022-04-03 ASSESSMENT — ENCOUNTER SYMPTOMS
EYES NEGATIVE: 1
VOMITING: 0
BRUISES/BLEEDS EASILY: 0
POLYDIPSIA: 0
FEVER: 0
PALPITATIONS: 0
CHILLS: 0
ABDOMINAL PAIN: 0
SHORTNESS OF BREATH: 0
NAUSEA: 0
COUGH: 0

## 2022-04-03 ASSESSMENT — PAIN DESCRIPTION - PAIN TYPE
TYPE: ACUTE PAIN;PHANTOM PAIN
TYPE: PHANTOM PAIN
TYPE: ACUTE PAIN;PHANTOM PAIN

## 2022-04-03 ASSESSMENT — GAIT ASSESSMENTS
ASSISTIVE DEVICE: FRONT WHEEL WALKER
DISTANCE (FEET): 125
DEVIATION: STEP TO;BRADYKINETIC
GAIT LEVEL OF ASSIST: CONTACT GUARD ASSIST

## 2022-04-03 ASSESSMENT — ACTIVITIES OF DAILY LIVING (ADL): BED_CHAIR_WHEELCHAIR_TRANSFER_DESCRIPTION: ADAPTIVE EQUIPMENT;SQUAT PIVOT TRANSFER TO WHEELCHAIR;SUPERVISION FOR SAFETY

## 2022-04-03 NOTE — DISCHARGE PLANNING
CASE MANAGEMENT INITIAL ASSESSMENT    Admit Date:  4/1/2022     Case Management has reviewed the medical chart and will meet with patient/family to discuss role of case management / discharge planning / team conference.     Patient is a  46 y.o. male transferred from [facility].    Attending phyisican: Dr. Friedman  PCP: Dr. Cardenas  Orthopedic: Dr. Larkin  ID: Dr. Jiménez     Diagnosis: S/P BKA (below knee amputation) unilateral, left ([facility]) [Z89.512]    Co-morbidities:   Patient Active Problem List    Diagnosis Date Noted   • Vitamin D deficiency 04/02/2022   • Thrombocytosis 04/02/2022   • Alkaline phosphatase elevation 04/02/2022   • Anemia 04/02/2022   • Leukocytosis 04/02/2022   • S/P BKA (below knee amputation) unilateral, left ([facility]) 04/01/2022   • Diabetic neuropathy associated with type 2 diabetes mellitus ([facility]) 12/08/2017   • Hypertension 12/08/2017   • DM (diabetes mellitus) ([facility]) 12/02/2014   • Peripheral arterial disease ([facility]) 12/02/2014     Prior Living Situation:  Housing / Facility: 3 Story House (can live on first floor)  Lives with - Patient's Self Care Capacity: Spouse    Prior Level of Function:  Medication Management: Independent  Finances: Independent  Home Management: Independent  Shopping: Independent  Prior Level Of Mobility: Independent Without Device in Community  Driving / Transportation: Unable To Determine At This Time    Support Systems:  Primary : Jeni Lantigua (mother) [phone]    Previous Services Utilized:   Equipment Owned: None  Prior Services: None,Home-Independent    Other Information:  Occupation (Pre-Hospital Vocational): Not Employed  Primary Payor Source: Medicaid (FFS)  Primary Care Practitioner : Jessa Cardenas  Other MDs: Dr. Larkin (ortho) Dr. Jiménez (ID)    Patient / Family Goal:  Patient / Family Goal: CM will meet with patient to discuss goals    Plan:  1. Continue to follow patient through hospitalization and provide discharge planning in collaboration with  patient, family, physicians and ancillary services.     2. Utilize community resources to ensure a safe discharge.

## 2022-04-03 NOTE — PROGRESS NOTES
Hospital Medicine Daily Progress Note      Chief Complaint  Leukocytosis  Hypertension  Diabetes    Interval Problem Update  Pt much more awake, alert, and conversant today.  Labs and imaging results reviewed and discussed w/ pt.    Review of Systems  Review of Systems   Constitutional: Negative for chills and fever.   HENT: Negative.    Eyes: Negative.    Respiratory: Negative for cough and shortness of breath.    Cardiovascular: Negative for chest pain and palpitations.   Gastrointestinal: Negative for abdominal pain, nausea and vomiting.   Genitourinary: Negative.    Musculoskeletal:        Wound pain    Skin: Negative for itching and rash.   Endo/Heme/Allergies: Negative for polydipsia. Does not bruise/bleed easily.        Physical Exam  Temp:  [36.7 °C (98.1 °F)-36.9 °C (98.5 °F)] 36.8 °C (98.3 °F)  Pulse:  [87-99] 87  Resp:  [16-18] 16  BP: (105-130)/(53-79) 105/71  SpO2:  [98 %-99 %] 98 %    Physical Exam  Vitals reviewed.   Constitutional:       General: He is not in acute distress.     Appearance: Normal appearance. He is not ill-appearing.   HENT:      Head: Normocephalic and atraumatic.      Right Ear: External ear normal.      Left Ear: External ear normal.      Nose: Nose normal.      Mouth/Throat:      Pharynx: Oropharynx is clear.   Eyes:      General:         Right eye: No discharge.         Left eye: No discharge.      Extraocular Movements: Extraocular movements intact.      Conjunctiva/sclera: Conjunctivae normal.   Cardiovascular:      Rate and Rhythm: Normal rate and regular rhythm.   Pulmonary:      Effort: Pulmonary effort is normal. No respiratory distress.      Breath sounds: Normal breath sounds. No wheezing.   Abdominal:      General: Bowel sounds are normal. There is no distension.      Palpations: Abdomen is soft.      Tenderness: There is no abdominal tenderness.   Musculoskeletal:      Cervical back: Normal range of motion and neck supple.      Right lower leg: No edema.      Comments:  L BKA dressed   Skin:     General: Skin is warm and dry.   Neurological:      Mental Status: He is alert and oriented to person, place, and time.         Fluids    Intake/Output Summary (Last 24 hours) at 4/3/2022 1235  Last data filed at 4/3/2022 0445  Gross per 24 hour   Intake 400 ml   Output 950 ml   Net -550 ml       Laboratory  Recent Labs     04/02/22  0602 04/03/22  0554   WBC 12.1* 10.5   RBC 3.68* 3.57*   HEMOGLOBIN 10.8* 10.4*   HEMATOCRIT 32.3* 31.5*   MCV 87.8 88.2   MCH 29.3 29.1   MCHC 33.4* 33.0*   RDW 42.4 43.2   PLATELETCT 453* 449*   MPV 9.4 9.4     Recent Labs     04/02/22  0602 04/03/22  0554   SODIUM 134* 139   POTASSIUM 4.0 4.0   CHLORIDE 100 104   CO2 23 24   GLUCOSE 142* 127*   BUN 19 18   CREATININE 0.89 0.79   CALCIUM 9.2 9.0                   Assessment/Plan  * S/P BKA (below knee amputation) unilateral, left (HCC)- (present on admission)  Assessment & Plan  Left foot OM S/P L BKA on 3/27/22 by Dr. Larkin at [facility]  Wound care and pain control per Physiatry  Recommend close monitoring for wound healing    Leukocytosis  Assessment & Plan  Completed Unasyn and Vancomycin for sepsis per ID at [facility]  On scheduled APAP per Physiatry  Suspect resolving infxn  PCT normal  ESR and CRP elevated, follow both  UA 0-2 WBC w/ negative bacteria  CXR negative acute  WBC resolved spontaneously w/o intervention    Anemia  Assessment & Plan  Has normocytic indices  Fe 29, start supplement if constipation not problematic  Follow H/H    Alkaline phosphatase elevation  Assessment & Plan  Likely 2/2 recent BKA  Consider further work up if persists, worsens, or develops GI symptoms  Follow Alk Phos levels to resolution    Thrombocytosis  Assessment & Plan  Likely reactive  Follow Platelet counts    Vitamin D deficiency  Assessment & Plan  Vit D level 6  On high dose supplementation per Physiatry    Hypertension- (present on admission)  Assessment & Plan  Decrease Lisinopril for hypotension    DM (diabetes  mellitus) (HCC)- (present on admission)  Assessment & Plan  HbA1c still pending  Increase Glargine for hyperglycemia  Continue SSI    Full Code    Discussed w/ pt and Dr. Quinonez

## 2022-04-03 NOTE — THERAPY
"Occupational Therapy  Daily Treatment     Patient Name: Juan Lantigua  Age:  46 y.o., Sex:  male  Medical Record #: [id number]  Today's Date: 4/3/2022     Precautions  Precautions: (P) Fall Risk,Non Weight Bearing Left Lower Extremity         Subjective    \"  Do we have time to do more?\" requesting extra activity at end of session      Objective       04/03/22 0931   Precautions   Precautions Fall Risk;Non Weight Bearing Left Lower Extremity   Functional Level of Assist   Bed, Chair, Wheelchair Transfer Standby Assist   Sitting Upper Body Exercises   Upper Extremity Bike Level 4 Resistance  (x 5 minutes  hydrocycle)   Other Exercise biceps/ triceps  strengthening   roosevelthaw  x 10 reps x 3  facing and backed in with 40lbs   Interdisciplinary Plan of Care Collaboration   Patient Position at End of Therapy Seated;Call Light within Reach;Tray Table within Reach;Self Releasing Lap Belt Applied   OT Total Time Spent   OT Individual Total Time Spent (Mins) 60   OT Charge Group   OT Therapy Activity 1   OT Therapeutic Exercise  3    Self propel w/c room <-> gym supervision      In // bars  static standing ring toss  with supervision    x 17 reps x 4    mod I sit <-> stands in // bars      Introduced   mirror therapy for phantom pain mgmt.   Performed   marches  kicks and ankle pumps x 10 reps x  2    Reviewed passport to independence with  patient  he verbalizes  understanding.       Assessment     Motivated to make functional gains. Participates to the best of his ability with all tasks presented this session     Strengths: Able to follow instructions,Adequate strength,Alert and oriented,Effective communication skills,Good insight into deficits/needs,Independent prior level of function,Manages pain appropriately,Motivated for self care and independence,Pleasant and cooperative,Supportive family,Willingly participates in therapeutic activities  Barriers: Impaired balance,Limited mobility,Pain    Plan   Collaborate with  PT   " referral to prosthetist to obtain limb protector.     ADL  IADL , related mobility  at w/c and FWW levels    strength/endurance building  standing tolerance and balance actiivty     Passport items complete   self feeding and grooming     Occupational Therapy Goals (Active)       Problem: Bathing       Dates: Start: 04/02/22         Goal: STG-Within one week, patient will bathe with modified independence.        Dates: Start: 04/02/22               Problem: Dressing       Dates: Start: 04/02/22         Goal: STG-Within one week, patient will dress LB with supervision and use of AE as needed.       Dates: Start: 04/02/22               Problem: Functional Transfers       Dates: Start: 04/02/22         Goal: STG-Within one week, patient will perform bathroom transfers with supervision and use of AE as needed.       Dates: Start: 04/02/22               Problem: OT Long Term Goals       Dates: Start: 04/02/22         Goal: LTG-By discharge, patient will complete basic self care tasks with modified independence and use of AE as needed.       Dates: Start: 04/02/22            Goal: LTG-By discharge, patient will perform bathroom transfers with modified independence and use of AE as needed.       Dates: Start: 04/02/22            Goal: LTG-By discharge, patient will complete basic home management with setup and supervision.        Dates: Start: 04/02/22               Problem: Toileting       Dates: Start: 04/02/22         Goal: STG-Within one week, patient will complete toileting tasks with supervision and use of AE as needed.       Dates: Start: 04/02/22

## 2022-04-03 NOTE — CARE PLAN
The patient is Stable - Low risk of patient condition declining or worsening    Shift Goals  Clinical Goals: safety  Patient Goals: pain control, sleep    Problem: Fall Risk - Rehab  Goal: Patient will remain free from falls  Outcome: Progressing. Pt uses call light consistently and appropriately. Waits for assistance does not attempt self transfer this shift. Able to verbalize needs.      Problem: Skin Integrity  Goal: Patient's skin integrity will be maintained or improve  Outcome: Progressing. Patient's skin remains intact and free from new or accidental injury this shift.  Will continue to monitor.

## 2022-04-03 NOTE — PROGRESS NOTES
Patient care assumed. Report received from day YARITZA Fuentes. Patient is alert and calm, resting in bed. Call light and bedside table within reach. Will continue to monitor.

## 2022-04-03 NOTE — THERAPY
Physical Therapy   Daily Treatment     Patient Name: Juan Lantigua  Age:  46 y.o., Sex:  male  Medical Record #: [id number]  Today's Date: 4/3/2022     Precautions  Precautions: (P) Fall Risk,Non Weight Bearing Left Lower Extremity    Subjective    I am feeling a little down. I would like to talk to a psychologist about losing my leg.     Objective       04/03/22 1501   Precautions   Precautions Fall Risk;Non Weight Bearing Left Lower Extremity   Pain 0 - 10 Group   Location Leg   Location Orientation Left   Pain Rating Scale (NPRS) 6   Description Aching;Throbbing   Comfort Goal Comfort with Movement   Therapist Pain Assessment Nurse Notified   Non Verbal Descriptors   Non Verbal Scale  Calm   Cognition    Level of Consciousness Alert   ABS (Agitated Behavior Scale)   Agitated Behavior Scale Performed Yes   Short Attention Span, Easy Distractibility, Inability to Concentrate 1   Impulsive, Impatient, Low Tolerance for Pain or Frustration 1   Uncooperative, Resistant to Care, Demanding 1   Violent and/or Threatening Violence Toward People or Property 1   Explosive and/or Unpredictable Anger 1   Rocking, Rubbing, Moaning, Other Self-Stimulating Behavior 1   Pulling at Tubes, Restraints, etc. 1   Wandering from Treatment Area 1   Restlessness, Pacing, Excessive Movement 1   Repetitive Behaviors, Motor and/or Verbal 1   Rapid, Loud or Excessive Talking 1   Sudden Changes of Mood 2   Easily Initiated - Excessive Crying and/or Laughter 1   Self-Abusiveness, Physical and/or Verbal 1   Agitated Behavior Scale Total Score 15   Level of Severity No Agitation   Sleep/Wake Cycle   Sleep & Rest Resting   Gait Functional Level of Assist    Gait Level Of Assist Contact Guard Assist   Assistive Device Front Wheel Walker   Distance (Feet) 125   # of Times Distance was Traveled 1   Deviation Step To;Bradykinetic   Wheelchair Functional Level of Assist   Wheelchair Assist Stand by Assist   Distance Wheelchair (Feet or Distance) 500 x 3  "  Wheelchair Description Supervision for safety;Leg rest management;Verbal cueing  (outdoor WC mobility, over bridge, ramps, slanted sidewalk)   Stairs Functional Level of Assist   Level of Assist with Stairs Contact Guard Assist   # of Stairs Climbed 2  (curb step, 6\", forward and backwards with FWW)   Stairs Description Walker;Verbal cueing;Supervision for safety   Transfer Functional Level of Assist   Bed, Chair, Wheelchair Transfer Standby Assist   Bed Chair Wheelchair Transfer Description Adaptive equipment;Squat pivot transfer to wheelchair;Supervision for safety   Supine Lower Body Exercise   Comments prone stretch x 10 min; L hip extension in prone, 10 reps x 1 with isometric hold at end range   Sitting Lower Body Exercises   Long Arc Quad 2 sets of 10;Left  (holding in entension at end range)   Bed Mobility    Supine to Sit Standby Assist   Sit to Supine Standby Assist   Sit to Stand Standby Assist   Neuro-Muscular Treatments   Neuro-Muscular Treatments Compensatory Strategies;Sequencing;Verbal Cuing;Tactile Cuing   Interdisciplinary Plan of Care Collaboration   IDT Collaboration with  Nursing;Occupational Therapist   Patient Position at End of Therapy In Bed;Call Light within Reach;Tray Table within Reach;Phone within Reach   Collaboration Comments pain level, CLOF   PT Total Time Spent   PT Individual Total Time Spent (Mins) 60   PT Charge Group   PT Gait Training 1   PT Therapeutic Exercise 1   PT Therapeutic Activities 2     Several IRF-Demetrice completed today, including car transfer.   Patient very mobile in WC- gloves issued to protect hands.     Assessment    Patient unhappy with not being able to see his wife today- she does not have a car and is waiting for family to give her a ride. She has clothes for the patient. Patient very motivated for short rehab stay. Needs appointment set up with prosthetic company. Will need a WC and FWW for DC. No manual assist required with transfers. Phantom pain " decreasing with desensitizing strategies.     Strengths: Able to follow instructions,Adequate strength,Alert and oriented,Effective communication skills,Independent prior level of function,Motivated for self care and independence,Pleasant and cooperative,Willingly participates in therapeutic activities  Barriers: Limited mobility,Pain,Fatigue    Plan    WC mobility indoor/outdoor, gait with FWW, progress HEP, transfer training from different surface heights. Residual limb pain management.    Passport items to be completed:  Get in/out of bed safely, in/out of a vehicle, safely use mobility device, walk or wheel around home/community, navigate up and down stairs, show how to get up/down from the ground, ensure home is accessible, demonstrate HEP, complete caregiver training    Physical Therapy Problems (Active)       Problem: Balance       Dates: Start: 04/02/22         Goal: STG-Within one week, patient will maintain dynamic standing x 5 min with FWW, SBA       Dates: Start: 04/02/22               Problem: Mobility       Dates: Start: 04/02/22         Goal: STG-Within one week, patient will propel wheelchair community distances  Lucinda over indoor surfaces, supervised outdoor surfaces, >300ft       Dates: Start: 04/02/22            Goal: STG-Within one week, patient will ambulate household distance with FWW, SBA, 150ft.       Dates: Start: 04/02/22            Goal: STG-Within one week, patient will ambulate up/down a curb with FWW, SBA       Dates: Start: 04/02/22               Problem: Mobility Transfers       Dates: Start: 04/02/22         Goal: STG-Within one week, patient will transfer bed to chair supervised with LRAD       Dates: Start: 04/02/22               Problem: PT-Long Term Goals       Dates: Start: 04/02/22         Goal: LTG-By discharge, patient will tolerate standing x 10 minutes with FWW, Lucinda, pain <3/10       Dates: Start: 04/02/22            Goal: LTG-By discharge, patient will ambulate x 200 ft  with FWW, Lucinda       Dates: Start: 04/02/22            Goal: LTG-By discharge, patient will transfer one surface to another Lucinda with LRAD       Dates: Start: 04/02/22            Goal: LTG-By discharge, patient will perform home exercise program Lucinda for stretching/strengthening L residual limb       Dates: Start: 04/02/22            Goal: LTG-By discharge, patient will ambulate up/down 4-6 stairs, SBA with LRAD/railings.       Dates: Start: 04/02/22

## 2022-04-04 LAB
GLUCOSE BLD STRIP.AUTO-MCNC: 111 MG/DL (ref 65–99)
GLUCOSE BLD STRIP.AUTO-MCNC: 159 MG/DL (ref 65–99)
GLUCOSE BLD STRIP.AUTO-MCNC: 240 MG/DL (ref 65–99)
GLUCOSE BLD STRIP.AUTO-MCNC: 240 MG/DL (ref 65–99)

## 2022-04-04 PROCEDURE — 99232 SBSQ HOSP IP/OBS MODERATE 35: CPT | Performed by: PHYSICAL MEDICINE & REHABILITATION

## 2022-04-04 PROCEDURE — 700102 HCHG RX REV CODE 250 W/ 637 OVERRIDE(OP): Performed by: PHYSICAL MEDICINE & REHABILITATION

## 2022-04-04 PROCEDURE — 97535 SELF CARE MNGMENT TRAINING: CPT

## 2022-04-04 PROCEDURE — 700102 HCHG RX REV CODE 250 W/ 637 OVERRIDE(OP): Performed by: HOSPITALIST

## 2022-04-04 PROCEDURE — 97110 THERAPEUTIC EXERCISES: CPT | Mod: CQ

## 2022-04-04 PROCEDURE — 82962 GLUCOSE BLOOD TEST: CPT | Mod: 91

## 2022-04-04 PROCEDURE — 97116 GAIT TRAINING THERAPY: CPT | Mod: CQ

## 2022-04-04 PROCEDURE — 97530 THERAPEUTIC ACTIVITIES: CPT | Mod: CQ

## 2022-04-04 PROCEDURE — 97530 THERAPEUTIC ACTIVITIES: CPT | Mod: CO

## 2022-04-04 PROCEDURE — 90791 PSYCH DIAGNOSTIC EVALUATION: CPT | Performed by: PSYCHOLOGIST

## 2022-04-04 PROCEDURE — 99232 SBSQ HOSP IP/OBS MODERATE 35: CPT | Performed by: HOSPITALIST

## 2022-04-04 PROCEDURE — A9270 NON-COVERED ITEM OR SERVICE: HCPCS | Performed by: PHYSICAL MEDICINE & REHABILITATION

## 2022-04-04 PROCEDURE — A9270 NON-COVERED ITEM OR SERVICE: HCPCS | Performed by: HOSPITALIST

## 2022-04-04 PROCEDURE — 770010 HCHG ROOM/CARE - REHAB SEMI PRIVAT*

## 2022-04-04 RX ADMIN — GABAPENTIN 300 MG: 300 CAPSULE ORAL at 20:39

## 2022-04-04 RX ADMIN — ACETAMINOPHEN 650 MG: 325 TABLET ORAL at 11:09

## 2022-04-04 RX ADMIN — OXYCODONE 5 MG: 5 TABLET ORAL at 05:40

## 2022-04-04 RX ADMIN — OXYCODONE 5 MG: 5 TABLET ORAL at 22:08

## 2022-04-04 RX ADMIN — TRAZODONE HYDROCHLORIDE 50 MG: 50 TABLET ORAL at 22:08

## 2022-04-04 RX ADMIN — LISINOPRIL 10 MG: 5 TABLET ORAL at 05:41

## 2022-04-04 RX ADMIN — ACETAMINOPHEN 650 MG: 325 TABLET ORAL at 05:39

## 2022-04-04 RX ADMIN — OXYCODONE 5 MG: 5 TABLET ORAL at 09:20

## 2022-04-04 RX ADMIN — OMEPRAZOLE 20 MG: 20 CAPSULE, DELAYED RELEASE ORAL at 08:23

## 2022-04-04 RX ADMIN — ACETAMINOPHEN 650 MG: 325 TABLET ORAL at 00:24

## 2022-04-04 RX ADMIN — OXYCODONE 5 MG: 5 TABLET ORAL at 18:36

## 2022-04-04 RX ADMIN — ACETAMINOPHEN 650 MG: 325 TABLET ORAL at 17:19

## 2022-04-04 ASSESSMENT — ENCOUNTER SYMPTOMS
CHILLS: 0
NAUSEA: 0
FEVER: 0
VOMITING: 0
COUGH: 0
EYES NEGATIVE: 1
BRUISES/BLEEDS EASILY: 0
SHORTNESS OF BREATH: 0
PALPITATIONS: 0
ABDOMINAL PAIN: 0
POLYDIPSIA: 0

## 2022-04-04 ASSESSMENT — GAIT ASSESSMENTS
DEVIATION: STEP TO;BRADYKINETIC
ASSISTIVE DEVICE: FRONT WHEEL WALKER
GAIT LEVEL OF ASSIST: CONTACT GUARD ASSIST
DISTANCE (FEET): 125
ASSISTIVE DEVICE: FRONT WHEEL WALKER
DISTANCE (FEET): 125
GAIT LEVEL OF ASSIST: CONTACT GUARD ASSIST

## 2022-04-04 ASSESSMENT — ACTIVITIES OF DAILY LIVING (ADL)
TOILET_TRANSFER_DESCRIPTION: VERBAL CUEING;SUPERVISION FOR SAFETY
BED_CHAIR_WHEELCHAIR_TRANSFER_DESCRIPTION: SQUAT PIVOT TRANSFER TO WHEELCHAIR;SUPERVISION FOR SAFETY
TUB_SHOWER_TRANSFER_DESCRIPTION: SHOWER BENCH;SET-UP OF EQUIPMENT;SUPERVISION FOR SAFETY;VERBAL CUEING

## 2022-04-04 ASSESSMENT — PAIN DESCRIPTION - PAIN TYPE: TYPE: ACUTE PAIN;PHANTOM PAIN

## 2022-04-04 NOTE — PROGRESS NOTES
"PSYCHOLOGICAL CONSULTATION:  Reason for admission: S/P BKA (below knee amputation) unilateral, left ([facility]) [Z89.512]  Reason for consult: adjustment to BKA  Requesting Physician: Elder    Legal status: Legal Status: Voluntary    Chief Complaint: \"I wanna go home\"    HPI:Mr. Juan Lantigua is a 47 yo M with no known PMHx and no previous care from a primary care doctor who presented to [facility] on 3/26/22 with a severe left foot infection. Per documentation, patient was evaluated in the emergency department and found to have gangrene and acute osteomyelitis and gas-forming infection in his left foot.  At time of admission patient had evidence of sepsis with heart rate of 130, WBC 30, lactic acid 2.8, ESR and CRP elevated to 19.5 and 9 respectively.  Patient was empirically started on Unasyn and vancomycin for sepsis.  Orthopedic surgery was consulted (Dr. Larkin) and patient was taken to the OR on 3/27 for a left below the knee amputation performed by Dr. Larkin.  Postoperatively patient's pain was controlled with Tylenol and oxycodone.    Psychology was consulted to assess adjustment to BKA, particularly given the rapid nature of infection onset and progression to amputation. Additionally, pt has been stating that he would like to be discharged prior to medically recommended date.    Risk Assessment: current symptoms as reported by pt.  Suicidal Ideation: Denies    Homicidal Ideation: Denies      Psychiatric Review of Systems:current symptoms as reported by pt.  Depression: Denies   Kasandra: Denies   Anxiety/Panic Attacks: Denies   PTSD symptom: Denies   Psychosis: Denies   Other:        Medical Review of Systems: as reported by pt. All systems reviewed. Only those found to be + are noted below. All others are negative.   Neurological:    TBIs: Denies   SZs:Denies   Strokes:Denies   Other:   Other medical symptoms:   Thyroid:Denies   Diabetes: Endorses   Cardiovascular disease: Denies    Past Psychiatric Hx: None " reported    Family Psychiatric Hx: None reported    Social Hx:   Social History     Socioeconomic History   • Marital status:    • Number of children: 5   Occupational History   • Occupation: unemployed    Tobacco Use   • Smoking status: Current Every Day Smoker     Packs/day: 0.50     Years: 26.00     Pack years: 13.00   • Smokeless tobacco: Never Used   Substance and Sexual Activity   • Alcohol use: Yes     Alcohol/week: 1.2 oz     Types: 2 Cans of beer per week   • Drug use: Yes     Frequency: 7.0 times per week     Types: Marijuana   • Sexual activity: Yes     Partners: Female     Birth control/protection: Condom        Medical Hx: Chart reviewed. Only those findings of potential interest to psychiatry are noted below:  Past Medical History:   Diagnosis Date   • Broken arm 2012    R     Medical Conditions:     Allergies: Amitriptyline and Metformin  Medications (currently prescribed at [facility]):    Current Facility-Administered Medications:   •  insulin GLARGINE (Lantus,Semglee) injection, 12 Units, Subcutaneous, Q EVENING, Chela Valles M.D., 12 Units at 04/03/22 2024  •  lisinopril (PRINIVIL) tablet 10 mg, 10 mg, Oral, Q DAY, Chela Valles M.D., 10 mg at 04/04/22 0541  •  vitamin D2 (Ergocalciferol) (Drisdol) capsule 50,000 Units, 50,000 Units, Oral, Q7 DAYS, Dary Quinonez M.D., 50,000 Units at 04/02/22 1122  •  insulin regular (HumuLIN R,NovoLIN R) injection, 2-12 Units, Subcutaneous, 4X/DAY ACHS, 2 Units at 04/04/22 1107 **AND** POC blood glucose manual result, , , Q AC AND BEDTIME(S) **AND** NOTIFY MD and PharmD, , , Once **AND** Administer 20 grams of glucose (approximately 8 ounces of fruit juice) every 15 minutes PRN FSBG less than 70 mg/dL, , , PRN **AND** dextrose 50% (D50W) injection 25 g, 25 g, Intravenous, Q15 MIN PRN, Chela Valles M.D.  •  Respiratory Therapy Consult, , Nebulization, Continuous RT, Juanita Friedman D.O.  •  Pharmacy Consult Request ...Pain Management Review 1 Each, 1 Each, Other,  PHARMACY TO DOSE, Juanita Friedman D.O.  •  acetaminophen (Tylenol) tablet 650 mg, 650 mg, Oral, Q4HRS PRN, CLARA BrownO.  •  senna-docusate (PERICOLACE or SENOKOT S) 8.6-50 MG per tablet 2 Tablet, 2 Tablet, Oral, BID, 2 Tablet at 04/03/22 0911 **AND** polyethylene glycol/lytes (MIRALAX) PACKET 1 Packet, 1 Packet, Oral, QDAY PRN **AND** magnesium hydroxide (MILK OF MAGNESIA) suspension 30 mL, 30 mL, Oral, QDAY PRN **AND** bisacodyl (DULCOLAX) suppository 10 mg, 10 mg, Rectal, QDAY PRN, KIMMY Brown.O.  •  omeprazole (PRILOSEC) capsule 20 mg, 20 mg, Oral, DAILY, CLARA BrownOEv, 20 mg at 04/04/22 0823  •  artificial tears ophthalmic solution 1 Drop, 1 Drop, Both Eyes, PRN, KIMMY Brown.O.  •  benzocaine-menthol (Cepacol) lozenge 1 Lozenge, 1 Lozenge, Mouth/Throat, Q2HRS PRN, KIMMY Brown.O.  •  mag hydrox-al hydrox-simeth (MAALOX PLUS ES or MYLANTA DS) suspension 20 mL, 20 mL, Oral, Q2HRS PRN, KIMMY Brown.O.  •  ondansetron (ZOFRAN ODT) dispertab 4 mg, 4 mg, Oral, 4X/DAY PRN **OR** ondansetron (ZOFRAN) syringe/vial injection 4 mg, 4 mg, Intramuscular, 4X/DAY PRN, KIMMY Brown.O.  •  traZODone (DESYREL) tablet 50 mg, 50 mg, Oral, QHS PRN, KIMMY Brown.O.  •  sodium chloride (OCEAN) 0.65 % nasal spray 2 Spray, 2 Spray, Nasal, PRN, KIMMY Brown.O.  •  acetaminophen (Tylenol) tablet 650 mg, 650 mg, Oral, Q6HRS, 650 mg at 04/04/22 1109 **FOLLOWED BY** [START ON 4/6/2022] acetaminophen (Tylenol) tablet 650 mg, 650 mg, Oral, Q6HRS PRN, KIMMY Brown.O.  •  enoxaparin (LOVENOX) inj 40 mg, 40 mg, Subcutaneous, DAILY, KIMMY Brown.O., 40 mg at 04/03/22 1728  •  oxyCODONE immediate-release (ROXICODONE) tablet 5 mg, 5 mg, Oral, Q4HRS PRN, KIMMY Brown.O., 5 mg at 04/04/22 0920  •  gabapentin (NEURONTIN) capsule 300 mg, 300 mg, Oral, QHS, KIMMY Brown.O., 300 mg at 04/03/22 2026  Labs:  Recent Results (from the past 24 hour(s))   POCT glucose device results     "Collection Time: 04/03/22  5:31 PM   Result Value Ref Range    POC Glucose, Blood 187 (H) 65 - 99 mg/dL   POCT glucose device results    Collection Time: 04/03/22  8:21 PM   Result Value Ref Range    POC Glucose, Blood 196 (H) 65 - 99 mg/dL   POCT glucose device results    Collection Time: 04/04/22  8:11 AM   Result Value Ref Range    POC Glucose, Blood 111 (H) 65 - 99 mg/dL   POCT glucose device results    Collection Time: 04/04/22 11:04 AM   Result Value Ref Range    POC Glucose, Blood 159 (H) 65 - 99 mg/dL          Psychiatric Examination:  Vitals: Blood pressure 114/75, pulse (!) 102, temperature 36.7 °C (98.1 °F), temperature source Oral, resp. rate 18, height 1.778 m (5' 10\"), weight 81.5 kg (179 lb 10.8 oz), SpO2 100 %.  Musculoskeletal:  normal psychomotor activity, no tics or unusual mannerisms noted  Appearance and Eye Contact: Easily established rapport WDWN, appropriate dress and grooming. Behavior is calm, cooperative,  appropriate eye contact  Attention/Alertness: Alert  Thought Process: Linear, Logical and Goal Directed    Thought Content: No psychotic processes noted  Speech: Clear with normal rate and rhythm  Mood: \"I'm doing okay\"            Affect: euthymic         SI/HI: Denies    Memory: Recent and remote memory appear intact     Orientation: alert, oriented to person, place and time  Insight into symptoms: good  Judgement into symptoms:good    Neuropsychological Testing:   Not formally tested.          ASSESSMENT: Mr. Juan Lantigua is a 45 yo M with no known PMHx and no previous care from a primary care doctor who presented to [facility] on 3/26/22 with a severe left foot infection. Per documentation, patient was evaluated in the emergency department and found to have gangrene and acute osteomyelitis and gas-forming infection in his left foot.  At time of admission patient had evidence of sepsis with heart rate of 130, WBC 30, lactic acid 2.8, ESR and CRP elevated to 19.5 and 9 " respectively.  Patient was empirically started on Unasyn and vancomycin for sepsis.  Orthopedic surgery was consulted (Dr. Larkin) and patient was taken to the OR on 3/27 for a left below the knee amputation performed by Dr. Larkin.  Postoperatively patient's pain was controlled with Tylenol and oxycodone.    Psychology was consulted to assess adjustment to BKA, particularly given the rapid nature of infection onset and progression to amputation. Additionally, pt has been stating that he would like to be discharged prior to medically recommended date.    Pt reports that while he is struggling to adjust to the loss of his limb, he is primarily concerned with his difficulty to communicate his feelings to his wife, who is the survivor of a severe MVA with severe TBI and has cognitive processing difficulties as a result. Pt demonstrates the 4 components of clear decision making capacity, and feels that he would be better able to heal and recover at home.      DSM5 Diagnostic Considerations: Adjustment disorder, unspecified      PLAN:  Records reviewed: Yes  Discussed patient with other provider: Elder  Will continue to follow  Thank you for the consult.    Susan Tai, Ph.D.

## 2022-04-04 NOTE — CARE PLAN
The patient is Stable - Low risk of patient condition declining or worsening    Shift Goals  Clinical Goals: safety  Patient Goals: pain control    Problem: Fall Risk - Rehab  Goal: Patient will remain free from falls  Outcome: Progressing. Pt uses call light consistently and appropriately. Waits for assistance does not attempt self transfer this shift. Able to verbalize needs.      Problem: Pain - Standard  Goal: Alleviation of pain or a reduction in pain to the patient’s comfort goal  Outcome: Not Met. Patient taking PRN pain medication. Patient educated on pain rating scale and medication side effects. Will continue to assess.

## 2022-04-04 NOTE — PROGRESS NOTES
Hospital Medicine Daily Progress Note      Chief Complaint  Leukocytosis  Hypertension  Diabetes    Interval Problem Update  Pt reports phantom pain in left leg, will defer to Physiatry.    Review of Systems  Review of Systems   Constitutional: Negative for chills and fever.   HENT: Negative.    Eyes: Negative.    Respiratory: Negative for cough and shortness of breath.    Cardiovascular: Negative for chest pain and palpitations.   Gastrointestinal: Negative for abdominal pain, nausea and vomiting.   Genitourinary: Negative.    Musculoskeletal:        Wound pain    Skin: Negative for itching and rash.   Endo/Heme/Allergies: Negative for polydipsia. Does not bruise/bleed easily.        Physical Exam  Temp:  [36.7 °C (98.1 °F)-36.9 °C (98.4 °F)] 36.9 °C (98.4 °F)  Pulse:  [85-95] 85  Resp:  [16-18] 18  BP: (109-113)/(66-72) 110/71  SpO2:  [97 %-100 %] 97 %    Physical Exam  Vitals reviewed.   Constitutional:       General: He is not in acute distress.     Appearance: Normal appearance. He is not ill-appearing.   HENT:      Head: Normocephalic and atraumatic.      Right Ear: External ear normal.      Left Ear: External ear normal.      Nose: Nose normal.      Mouth/Throat:      Pharynx: Oropharynx is clear.   Eyes:      General:         Right eye: No discharge.         Left eye: No discharge.      Extraocular Movements: Extraocular movements intact.      Conjunctiva/sclera: Conjunctivae normal.   Cardiovascular:      Rate and Rhythm: Normal rate and regular rhythm.   Pulmonary:      Effort: Pulmonary effort is normal. No respiratory distress.      Breath sounds: Normal breath sounds. No wheezing.   Abdominal:      General: Bowel sounds are normal. There is no distension.      Palpations: Abdomen is soft.      Tenderness: There is no abdominal tenderness.   Musculoskeletal:      Cervical back: Normal range of motion and neck supple.      Right lower leg: No edema.      Comments: L BKA dressed   Skin:     General: Skin  is warm and dry.   Neurological:      Mental Status: He is alert and oriented to person, place, and time.         Fluids    Intake/Output Summary (Last 24 hours) at 4/4/2022 1039  Last data filed at 4/4/2022 0900  Gross per 24 hour   Intake 720 ml   Output 2075 ml   Net -1355 ml       Laboratory  Recent Labs     04/02/22  0602 04/03/22  0554   WBC 12.1* 10.5   RBC 3.68* 3.57*   HEMOGLOBIN 10.8* 10.4*   HEMATOCRIT 32.3* 31.5*   MCV 87.8 88.2   MCH 29.3 29.1   MCHC 33.4* 33.0*   RDW 42.4 43.2   PLATELETCT 453* 449*   MPV 9.4 9.4     Recent Labs     04/02/22  0602 04/03/22  0554   SODIUM 134* 139   POTASSIUM 4.0 4.0   CHLORIDE 100 104   CO2 23 24   GLUCOSE 142* 127*   BUN 19 18   CREATININE 0.89 0.79   CALCIUM 9.2 9.0                   Assessment/Plan  * S/P BKA (below knee amputation) unilateral, left (HCC)- (present on admission)  Assessment & Plan  Left foot OM S/P L BKA on 3/27/22 by Dr. Larkin at [facility]  Wound care and pain control per Physiatry  Recommend close monitoring for wound healing    Leukocytosis  Assessment & Plan  Completed Unasyn and Vancomycin for sepsis per ID at [facility]  On scheduled APAP per Physiatry  Suspect resolving infxn  PCT normal  ESR and CRP elevated, follow both  UA 0-2 WBC w/ negative bacteria  CXR negative acute  WBC resolved spontaneously w/o intervention    Anemia  Assessment & Plan  Has normocytic indices  Fe 29, start supplement if constipation not problematic  Follow H/H    Alkaline phosphatase elevation  Assessment & Plan  Likely 2/2 recent BKA  Consider further work up if persists, worsens, or develops GI symptoms  Follow Alk Phos levels to resolution    Thrombocytosis  Assessment & Plan  Likely reactive  Follow Platelet counts    Vitamin D deficiency  Assessment & Plan  Vit D level 6  On high dose supplementation per Physiatry    Hypertension- (present on admission)  Assessment & Plan  Hypotension improved w/ decreased Lisinopril    DM (diabetes mellitus) ([facility])- (present on  admission)  Assessment & Plan  HbA1c remains pending  Observe serum glucose trends on Glargine and SSI  Outpt meds include Lantus 20 u qam and 10 u qhs    Full Code

## 2022-04-04 NOTE — PROGRESS NOTES
"Rehab Progress Note     Date of Service: 4/4/2022  Chief Complaint: follow up BKA    Interval Events (Subjective)  Patient seen and examined in [place], patient's family visiting most of the day today. Patient asking to leave tomorrow afternoon. Reviewed with patient his DC date will be determined after his team conference tomorrow and that he wouldn't be able to leave before his equipment arrives. Patient is willing to stay until tomorrow afternoon. Per staff, patient stating that he wants to leave in order to go to the dispensary ASAP. Hospitalist consulted for patient's BG controll and diabetes mediation. Patient doing well with therapy, is functioning at a CGA level with FWW. Denies increased residual limb pain, does have phantom pain.     ROS: No changes to bowel, bladder, pain, or sleep; is frustrated about staying long than he wants to. +phantom pain       Objective:  Physical Exam:  Vitals: /71   Pulse 85   Temp 36.9 °C (98.4 °F) (Oral)   Resp 18   Ht 1.778 m (5' 10\")   Wt 81.5 kg (179 lb 10.8 oz)   SpO2 97%   Gen: NAD, seated comfortably in MWC   Head: NC/AT  Eyes/ Nose/ Mouth: PERRLA, moist mucous membranes  Pulm: normal respiratory effort, no cyanosis, no witnessed wheezes or coughing   Abd: Soft NTND, negative borborygmi   Ext: No peripheral edema. No calf tenderness. No pain at palpation of residual limb     Mental status:  A&Ox4 (person, place, date, situation) answers questions appropriately follows commands  Speech: fluent, no aphasia or dysarthria        Recent Results (from the past 72 hour(s))   POCT glucose device results    Collection Time: 04/01/22 11:58 AM   Result Value Ref Range    POC Glucose, Blood 226 (H) 65 - 99 mg/dL   CoV-2 and Flu A/B by PCR ([email or web address])    Collection Time: 04/01/22 12:06 PM   Result Value Ref Range    Influenza virus A RNA Negative Negative    Influenza virus B, PCR Negative Negative    SARS-CoV-2 by PCR NotDetected     SARS-CoV-2 Source Nasal Swab    POCT " glucose device results    Collection Time: 04/01/22  5:19 PM   Result Value Ref Range    POC Glucose, Blood 181 (H) 65 - 99 mg/dL   POCT glucose device results    Collection Time: 04/01/22  8:42 PM   Result Value Ref Range    POC Glucose, Blood 175 (H) 65 - 99 mg/dL   CBC with Differential    Collection Time: 04/02/22  6:02 AM   Result Value Ref Range    WBC 12.1 (H) 4.8 - 10.8 K/uL    RBC 3.68 (L) 4.70 - 6.10 M/uL    Hemoglobin 10.8 (L) 14.0 - 18.0 g/dL    Hematocrit 32.3 (L) 42.0 - 52.0 %    MCV 87.8 81.4 - 97.8 fL    MCH 29.3 27.0 - 33.0 pg    MCHC 33.4 (L) 33.7 - 35.3 g/dL    RDW 42.4 35.9 - 50.0 fL    Platelet Count 453 (H) 164 - 446 K/uL    MPV 9.4 9.0 - 12.9 fL    Neutrophils-Polys 75.90 (H) 44.00 - 72.00 %    Lymphocytes 16.40 (L) 22.00 - 41.00 %    Monocytes 4.40 0.00 - 13.40 %    Eosinophils 2.30 0.00 - 6.90 %    Basophils 0.30 0.00 - 1.80 %    Immature Granulocytes 0.70 0.00 - 0.90 %    Nucleated RBC 0.00 /100 WBC    Neutrophils (Absolute) 9.21 (H) 1.82 - 7.42 K/uL    Lymphs (Absolute) 1.99 1.00 - 4.80 K/uL    Monos (Absolute) 0.53 0.00 - 0.85 K/uL    Eos (Absolute) 0.28 0.00 - 0.51 K/uL    Baso (Absolute) 0.04 0.00 - 0.12 K/uL    Immature Granulocytes (abs) 0.08 0.00 - 0.11 K/uL    NRBC (Absolute) 0.00 K/uL   Comp Metabolic Panel (CMP)    Collection Time: 04/02/22  6:02 AM   Result Value Ref Range    Sodium 134 (L) 135 - 145 mmol/L    Potassium 4.0 3.6 - 5.5 mmol/L    Chloride 100 96 - 112 mmol/L    Co2 23 20 - 33 mmol/L    Anion Gap 11.0 7.0 - 16.0    Glucose 142 (H) 65 - 99 mg/dL    Bun 19 8 - 22 mg/dL    Creatinine 0.89 0.50 - 1.40 mg/dL    Calcium 9.2 8.5 - 10.5 mg/dL    AST(SGOT) 26 12 - 45 U/L    ALT(SGPT) 15 2 - 50 U/L    Alkaline Phosphatase 139 (H) 30 - 99 U/L    Total Bilirubin 0.3 0.1 - 1.5 mg/dL    Albumin 2.8 (L) 3.2 - 4.9 g/dL    Total Protein 8.0 6.0 - 8.2 g/dL    Globulin 5.2 (H) 1.9 - 3.5 g/dL    A-G Ratio 0.5 g/dL   Vitamin D, 25-hydroxy (blood)    Collection Time: 04/02/22  6:02 AM    Result Value Ref Range    25-Hydroxy   Vitamin D 25 6 (L) 30 - 100 ng/mL   ESTIMATED GFR    Collection Time: 04/02/22  6:02 AM   Result Value Ref Range    GFR (CKD-EPI) 106 >60 mL/min/1.73 m 2   POCT glucose device results    Collection Time: 04/02/22  8:06 AM   Result Value Ref Range    POC Glucose, Blood 192 (H) 65 - 99 mg/dL   POCT glucose device results    Collection Time: 04/02/22 11:22 AM   Result Value Ref Range    POC Glucose, Blood 242 (H) 65 - 99 mg/dL   POCT glucose device results    Collection Time: 04/02/22  5:09 PM   Result Value Ref Range    POC Glucose, Blood 111 (H) 65 - 99 mg/dL   URINALYSIS    Collection Time: 04/02/22  5:30 PM    Specimen: Urine, Clean Catch   Result Value Ref Range    Color Yellow     Character Clear     Specific Gravity 1.010 <1.035    Ph 6.5 5.0 - 8.0    Glucose 250 (A) Negative mg/dL    Ketones Negative Negative mg/dL    Protein 100 (A) Negative mg/dL    Bilirubin Negative Negative    Urobilinogen, Urine 0.2 Negative    Nitrite Negative Negative    Leukocyte Esterase Negative Negative    Occult Blood Trace (A) Negative    Micro Urine Req Microscopic    URINE MICROSCOPIC (W/UA)    Collection Time: 04/02/22  5:30 PM   Result Value Ref Range    WBC 0-2 (A) /hpf    RBC 2-5 (A) /hpf    Bacteria Negative None /hpf    Epithelial Cells Negative /hpf    Hyaline Cast 0-2 /lpf   POCT glucose device results    Collection Time: 04/02/22  9:12 PM   Result Value Ref Range    POC Glucose, Blood 202 (H) 65 - 99 mg/dL   PROCALCITONIN    Collection Time: 04/03/22  5:54 AM   Result Value Ref Range    Procalcitonin 0.06 <0.25 ng/mL   Sed Rate    Collection Time: 04/03/22  5:54 AM   Result Value Ref Range    Sed Rate Westergren 117 (H) 0 - 20 mm/hour   CRP QUANTITIVE (NON-CARDIAC)    Collection Time: 04/03/22  5:54 AM   Result Value Ref Range    Stat C-Reactive Protein 2.97 (H) 0.00 - 0.75 mg/dL   CBC WITHOUT DIFFERENTIAL    Collection Time: 04/03/22  5:54 AM   Result Value Ref Range    WBC 10.5  4.8 - 10.8 K/uL    RBC 3.57 (L) 4.70 - 6.10 M/uL    Hemoglobin 10.4 (L) 14.0 - 18.0 g/dL    Hematocrit 31.5 (L) 42.0 - 52.0 %    MCV 88.2 81.4 - 97.8 fL    MCH 29.1 27.0 - 33.0 pg    MCHC 33.0 (L) 33.7 - 35.3 g/dL    RDW 43.2 35.9 - 50.0 fL    Platelet Count 449 (H) 164 - 446 K/uL    MPV 9.4 9.0 - 12.9 fL   Comp Metabolic Panel    Collection Time: 04/03/22  5:54 AM   Result Value Ref Range    Sodium 139 135 - 145 mmol/L    Potassium 4.0 3.6 - 5.5 mmol/L    Chloride 104 96 - 112 mmol/L    Co2 24 20 - 33 mmol/L    Anion Gap 11.0 7.0 - 16.0    Glucose 127 (H) 65 - 99 mg/dL    Bun 18 8 - 22 mg/dL    Creatinine 0.79 0.50 - 1.40 mg/dL    Calcium 9.0 8.5 - 10.5 mg/dL    AST(SGOT) 19 12 - 45 U/L    ALT(SGPT) 12 2 - 50 U/L    Alkaline Phosphatase 130 (H) 30 - 99 U/L    Total Bilirubin 0.3 0.1 - 1.5 mg/dL    Albumin 2.7 (L) 3.2 - 4.9 g/dL    Total Protein 7.5 6.0 - 8.2 g/dL    Globulin 4.8 (H) 1.9 - 3.5 g/dL    A-G Ratio 0.6 g/dL   IRON/TOTAL IRON BIND    Collection Time: 04/03/22  5:54 AM   Result Value Ref Range    Iron 29 (L) 50 - 180 ug/dL    Total Iron Binding 193 (L) 250 - 450 ug/dL    Unsat Iron Binding 164 110 - 370 ug/dL    % Saturation 15 15 - 55 %   ESTIMATED GFR    Collection Time: 04/03/22  5:54 AM   Result Value Ref Range    GFR (CKD-EPI) 110 >60 mL/min/1.73 m 2   POCT glucose device results    Collection Time: 04/03/22  7:39 AM   Result Value Ref Range    POC Glucose, Blood 131 (H) 65 - 99 mg/dL   POCT glucose device results    Collection Time: 04/03/22 11:25 AM   Result Value Ref Range    POC Glucose, Blood 191 (H) 65 - 99 mg/dL   POCT glucose device results    Collection Time: 04/03/22  5:31 PM   Result Value Ref Range    POC Glucose, Blood 187 (H) 65 - 99 mg/dL   POCT glucose device results    Collection Time: 04/03/22  8:21 PM   Result Value Ref Range    POC Glucose, Blood 196 (H) 65 - 99 mg/dL   POCT glucose device results    Collection Time: 04/04/22  8:11 AM   Result Value Ref Range    POC Glucose, Blood  111 (H) 65 - 99 mg/dL       Current Facility-Administered Medications   Medication Frequency   • insulin GLARGINE (Lantus,Semglee) injection Q EVENING   • lisinopril (PRINIVIL) tablet 10 mg Q DAY   • vitamin D2 (Ergocalciferol) (Drisdol) capsule 50,000 Units Q7 DAYS   • insulin regular (HumuLIN R,NovoLIN R) injection 4X/DAY ACHS    And   • dextrose 50% (D50W) injection 25 g Q15 MIN PRN   • Respiratory Therapy Consult Continuous RT   • Pharmacy Consult Request ...Pain Management Review 1 Each PHARMACY TO DOSE   • acetaminophen (Tylenol) tablet 650 mg Q4HRS PRN   • senna-docusate (PERICOLACE or SENOKOT S) 8.6-50 MG per tablet 2 Tablet BID    And   • polyethylene glycol/lytes (MIRALAX) PACKET 1 Packet QDAY PRN    And   • magnesium hydroxide (MILK OF MAGNESIA) suspension 30 mL QDAY PRN    And   • bisacodyl (DULCOLAX) suppository 10 mg QDAY PRN   • omeprazole (PRILOSEC) capsule 20 mg DAILY   • artificial tears ophthalmic solution 1 Drop PRN   • benzocaine-menthol (Cepacol) lozenge 1 Lozenge Q2HRS PRN   • mag hydrox-al hydrox-simeth (MAALOX PLUS ES or MYLANTA DS) suspension 20 mL Q2HRS PRN   • ondansetron (ZOFRAN ODT) dispertab 4 mg 4X/DAY PRN    Or   • ondansetron (ZOFRAN) syringe/vial injection 4 mg 4X/DAY PRN   • traZODone (DESYREL) tablet 50 mg QHS PRN   • sodium chloride (OCEAN) 0.65 % nasal spray 2 Spray PRN   • acetaminophen (Tylenol) tablet 650 mg Q6HRS    Followed by   • [START ON 4/6/2022] acetaminophen (Tylenol) tablet 650 mg Q6HRS PRN   • enoxaparin (LOVENOX) inj 40 mg DAILY   • oxyCODONE immediate-release (ROXICODONE) tablet 5 mg Q4HRS PRN   • gabapentin (NEURONTIN) capsule 300 mg QHS       Orders Placed This Encounter   Procedures   • Diet Order Diet: Consistent CHO (Diabetic)     Standing Status:   Standing     Number of Occurrences:   1     Order Specific Question:   Diet:     Answer:   Consistent CHO (Diabetic) [4]       Assessment:  Active Hospital Problems    Diagnosis    • *S/P BKA (below knee  amputation) unilateral, left (HCC)    • Vitamin D deficiency    • Thrombocytosis    • Alkaline phosphatase elevation    • Anemia    • Leukocytosis    • Hypertension    • Diabetic neuropathy associated with type 2 diabetes mellitus (HCC)    • DM (diabetes mellitus) (HCC)    • Peripheral arterial disease (HCC)        Medical Decision Making and Plan:    L BKA  - s/p LLE gangrene wound infection   - underwent L BKA on 3/27 performed by Dr. Larkin   - monitor for changes in perfusion/canges in dusky appearance   - will need , rigid removable dressing, and residual limb board on WC   - establish with P&O  - begin mirror therapy for phantom pain   - follow up with outpatient PM&R an follow up with Ortho (Dr. Larkin)      HTN   - transferred on lisinopril 20mg qday, decreased to 10mg qday   - BP well controlled      Uncontrolled DM with hyperglycemia  - transferred on Lantus 10 units QHS and SSI; increased to 12 units qhs   - will need diabetes education before DC   - appreciate Hospitalist assistance      ABLA  -Postoperative hemoglobin 9.5 -> improved to 10.4 4/3       Leukocytosis, resolved   - WBC improved to 10.5      Adjustment disorder  -Patient reporting feeling that his mood is down since his amputation  - consulted rehab psychology     Pain  - Tylenol and Oxycodone PRN   - gabapentin 300mg qhs      Bowel  - constipation prevention with scheduled senna   - bowel meds PRN   - last BM 4/3      Bladder   - timed void to prevent falls and incont epsidoes   -bladder scan if no void > 6hrs, PVR, and cath if retaining > 200ml   - no evidence of retention      GI ppx: Omeprazole      DVT ppx: lovenox         Total time:  25 minutes.  I spent greater than 50% of the time for patient care and coordination on this date, including unit/floor time, and face-to-face time with the patient as per assessment and plan above.    Juanita Friedman D.O.   Physical Medicine and Rehabilitation

## 2022-04-04 NOTE — CARE PLAN
The patient is Watcher - Medium risk of patient condition declining or worsening    Shift Goals  Clinical Goals: safety  Patient Goals: pain control, sleep    Problem: Fall Risk - Rehab  Goal: Patient will remain free from falls  Outcome: Progressing: MODERATE falls risk, remains free of fall.     Problem: Pain - Standard  Goal: Alleviation of pain or a reduction in pain to the patient’s comfort goal  Outcome: Progressing: PRN oxycodone 5 mg administered twice today for pt c/o 6/10 pain to LLE.

## 2022-04-04 NOTE — THERAPY
"Physical Therapy   Daily Treatment     Patient Name: Juan Lantigua  Age:  46 y.o., Sex:  male  Medical Record #: [id number]  Today's Date: 4/4/2022     Precautions  Precautions: Fall Risk,Non Weight Bearing Left Lower Extremity    Subjective    Pt greeted in the room, he was on the phone with family. Pt expressing eagerness to leave today    \"I came here voluntarily, so cant I just leave on my own?\"    \" I really feel like I do not need to be here, there are probably other people who need therapy more than I do\"     Objective       04/04/22 0701   Pain   Intervention Education;Emotional Support   Pain 0 - 10 Group   Location Leg;Foot   Location Orientation Left   Description Other (Comments)  (phantom limb pain/toes)   Comfort Goal Comfort with Movement;Perform Activity   Therapist Pain Assessment Post Activity Pain Same as Prior to Activity   Gait Functional Level of Assist    Gait Level Of Assist Contact Guard Assist   Assistive Device Front Wheel Walker   Distance (Feet) 125   # of Times Distance was Traveled 1   Deviation Step To;Bradykinetic   Wheelchair Functional Level of Assist   Wheelchair Assist Supervised   Distance Wheelchair (Feet or Distance) 125x2   Wheelchair Description Supervision for safety;Leg rest management   Transfer Functional Level of Assist   Bed, Chair, Wheelchair Transfer Standby Assist   Bed Chair Wheelchair Transfer Description Squat pivot transfer to wheelchair;Supervision for safety   Supine Lower Body Exercise   Supine Lower Body Exercises Yes   Hip Abduction 3 sets of 10;Side Lying   Hip Adduction  3 sets of 10;Bilateral   Straight Leg Raises 3 sets of 10;Left   Gluteal Isometrics 1 set of 10   Quadriceps Isometrics 1 set of 10   Other Exercises prone hip extension, prone knee flexion L LE, prone hip flexor stretch x 10 minutes   Bed Mobility    Supine to Sit Supervised   Sit to Supine Supervised   Sit to Stand Standby Assist   Scooting Supervised   Rolling Supervised   Neuro-Muscular " Treatments   Neuro-Muscular Treatments Verbal Cuing;Sequencing   Comments requires cues for proper form and control with exercises, palpated quad mm with iso hold. BKA education-see note for details   Interdisciplinary Plan of Care Collaboration   IDT Collaboration with  Certified O.T. Assistant  (ANDERSEN)   Patient Position at End of Therapy In Bed;Call Light within Reach   Collaboration Comments pt's eagerness to leave ASAP/CLOF   PT Total Time Spent   PT Individual Total Time Spent (Mins) 60   PT Charge Group   PT Gait Training 1   PT Therapeutic Exercise 2   PT Therapeutic Activities 1   Supervising Physical Therapist Zulema Harmon       Provided cont edu on BKA including additional exercises with hand outs. Desternalization techniques, residual limb wrapping, equipment needs including IPOP, WC, FWW and ramp    Assessment    Pt participated in BKA mat program as indicated in the fow sheet. Pt became increasingly frustrated as therapist explained he would likely not be able to leave today because equipment has yet to be ordered. edu pt on IDT scheduled for tomorrow.    Pt will require an 18W x 16D wc with R std foot rest and L residual limb board, gel cushion and FWW for transfers/mobility    Strengths: Able to follow instructions,Adequate strength,Alert and oriented,Effective communication skills,Independent prior level of function,Motivated for self care and independence,Pleasant and cooperative,Willingly participates in therapeutic activities  Barriers: Limited mobility,Pain,Fatigue    Plan    WC mobility indoor/outdoor, gait with FWW, progress HEP, transfer training from different surface heights. Residual limb pain management. Contact ability re: IPOP, prosthetist consult, floor recovery, cont residual latoya wrapping and care, consider mirror therapy for residual limb pain     Passport items to be completed:  Get in/out of bed safely, in/out of a vehicle, safely use mobility device, walk or wheel around  home/community, navigate up and down stairs, show how to get up/down from the ground, ensure home is accessible, demonstrate HEP, complete caregiver training    Physical Therapy Problems (Active)       Problem: Balance       Dates: Start: 04/02/22         Goal: STG-Within one week, patient will maintain dynamic standing x 5 min with FWW, SBA       Dates: Start: 04/02/22               Problem: Mobility       Dates: Start: 04/02/22         Goal: STG-Within one week, patient will propel wheelchair community distances  Lucinda over indoor surfaces, supervised outdoor surfaces, >300ft       Dates: Start: 04/02/22            Goal: STG-Within one week, patient will ambulate household distance with FWW, SBA, 150ft.       Dates: Start: 04/02/22            Goal: STG-Within one week, patient will ambulate up/down a curb with FWW, SBA       Dates: Start: 04/02/22               Problem: Mobility Transfers       Dates: Start: 04/02/22         Goal: STG-Within one week, patient will transfer bed to chair supervised with LRAD       Dates: Start: 04/02/22               Problem: PT-Long Term Goals       Dates: Start: 04/02/22         Goal: LTG-By discharge, patient will tolerate standing x 10 minutes with FWW, Lucinda, pain <3/10       Dates: Start: 04/02/22            Goal: LTG-By discharge, patient will ambulate x 200 ft with FWW, Lucinda       Dates: Start: 04/02/22            Goal: LTG-By discharge, patient will transfer one surface to another Lucinda with LRAD       Dates: Start: 04/02/22            Goal: LTG-By discharge, patient will perform home exercise program Lucinda for stretching/strengthening L residual limb       Dates: Start: 04/02/22            Goal: LTG-By discharge, patient will ambulate up/down 4-6 stairs, SBA with LRAD/railings.       Dates: Start: 04/02/22

## 2022-04-04 NOTE — THERAPY
"Occupational Therapy  Daily Treatment     Patient Name: Juan Lantigua  Age:  46 y.o., Sex:  male  Medical Record #: [id number]  Today's Date: 4/4/2022     Precautions  Precautions: (P) Fall Risk,Non Weight Bearing Left Lower Extremity         Subjective    \"I feel safer with the tub transfer bench\"     Objective       04/04/22 1001   Precautions   Precautions Fall Risk   Cognition    Level of Consciousness Alert   Functional Level of Assist   Toilet Transfers Contact Guard Assist   Toilet Transfer Description Verbal cueing;Supervision for safety  (simulated home environment transfer 2x, no grab bars used)   Tub / Shower Transfers Contact Guard Assist   Tub Shower Transfer Description Shower bench;Set-up of equipment;Supervision for safety;Verbal cueing  (tub/shower bench, trialed shower chair transfer 2x tripping over shower lip. Pt stumbled and required mod a to recover balance. Transitioned to tub transfer bench practicing 3x requiring CGA.)   Interdisciplinary Plan of Care Collaboration   IDT Collaboration with  Physical Therapist Assistant (PTA);Family / Caregiver   Patient Position at End of Therapy Seated;Family / Friend in Room   Collaboration Comments PTA re: pt CLOF and discharge status, wife re: equipment and present for end of tx   Wife was present at the end of session however did not receive training for the full session.     Assessment    Pt able to complete both shower transfers however pt is safer with the tub transfer to avoid jumping over the shower lip. Pt able to complete toilet transfer with FWW and CGA while not using grab bars.      Strengths: Able to follow instructions,Adequate strength,Alert and oriented,Effective communication skills,Good insight into deficits/needs,Independent prior level of function,Manages pain appropriately,Motivated for self care and independence,Pleasant and cooperative,Supportive family,Willingly participates in therapeutic activities  Barriers: Impaired balance,Limited " mobility,Pain    Plan    Complete passport items, potential D/C, family training       Occupational Therapy Goals (Active)       Problem: Bathing       Dates: Start: 04/02/22         Goal: STG-Within one week, patient will bathe with modified independence.        Dates: Start: 04/02/22               Problem: Dressing       Dates: Start: 04/02/22         Goal: STG-Within one week, patient will dress LB with supervision and use of AE as needed.       Dates: Start: 04/02/22               Problem: Functional Transfers       Dates: Start: 04/02/22         Goal: STG-Within one week, patient will perform bathroom transfers with supervision and use of AE as needed.       Dates: Start: 04/02/22               Problem: OT Long Term Goals       Dates: Start: 04/02/22         Goal: LTG-By discharge, patient will complete basic self care tasks with modified independence and use of AE as needed.       Dates: Start: 04/02/22            Goal: LTG-By discharge, patient will perform bathroom transfers with modified independence and use of AE as needed.       Dates: Start: 04/02/22            Goal: LTG-By discharge, patient will complete basic home management with setup and supervision.        Dates: Start: 04/02/22               Problem: Toileting       Dates: Start: 04/02/22         Goal: STG-Within one week, patient will complete toileting tasks with supervision and use of AE as needed.       Dates: Start: 04/02/22

## 2022-04-04 NOTE — THERAPY
"Occupational Therapy  Daily Treatment     Patient Name: Juan Lantigua  Age:  46 y.o., Sex:  male  Medical Record #: [id number]  Today's Date: 4/4/2022     Precautions  Precautions: (P) Fall Risk,Non Weight Bearing Left Lower Extremity         Subjective    \" I escorted my wife out. She had to get back to [place] . Her mom has an appointment.\"     Objective/Assessment     04/04/22 1301   Precautions   Precautions Fall Risk;Non Weight Bearing Left Lower Extremity   Functional Level of Assist   Bed, Chair, Wheelchair Transfer Modified Independent  (w/c <-> mat  including  leg rest mgmt)   Toilet Transfers Modified Independent   Tub / Shower Transfers Modified Independent  (once to toilet in bathroom and once to BSC over standard toilet as this is what is recomended for home)   Interdisciplinary Plan of Care Collaboration   IDT Collaboration with     Patient Position at End of Therapy Seated;Call Light within Reach;Tray Table within Reach   Collaboration Comments equipment needed if d/c takes place tomorrow    16 inch w/c , cushion  leg rest   residual limp support,  tub bench and 3 in one commode.   OT Total Time Spent   OT Individual Total Time Spent (Mins) 60   OT Charge Group   OT Therapy Activity 4        Note documentation error   Tub/shower transfer  Did not take place  This session and he required CGA  Just prior to this session with  Treating  OT   Time spent attempting to locate patient for tx session  He was eventually found in his room.  He reported he had gone outside with his wife.       Spouse had left therefore continued hands on training could not take place      Provided education for clarification of  suction type vs. Pin type of prosthesis  as he though  that the pin meant that someone might put a pin in his residual limb.   Verbalized understanding  after  using visual aids and explanation from therapist as well as  You tube videos.    Utilized youtube video for education regarding  " "stump wrapping as patient still needs to practice  self wrapping.        Collaborated with OT  who saw patient in AM she reported that  spouse was not present when they  worked on bathroom DME and tub/ shower transfer problem solving  at  10 am Session     Patient had reported at 1030  \" we already did that \" when I suggested  hands on practice for  tub  and toilet transfers .      Changed to  16 inch w/c with elevated leg rest  for residual limb support     Continues to report he will leave   Tuesday 4-5          Strengths: Able to follow instructions,Adequate strength,Alert and oriented,Effective communication skills,Good insight into deficits/needs,Independent prior level of function,Manages pain appropriately,Motivated for self care and independence,Pleasant and cooperative,Supportive family,Willingly participates in therapeutic activities  Barriers: Impaired balance,Limited mobility,Pain    Plan     ADL routine in AM       Occupational Therapy Goals (Active)       Problem: Bathing       Dates: Start: 04/02/22         Goal: STG-Within one week, patient will bathe with modified independence.        Dates: Start: 04/02/22               Problem: Dressing       Dates: Start: 04/02/22         Goal: STG-Within one week, patient will dress LB with supervision and use of AE as needed.       Dates: Start: 04/02/22               Problem: Functional Transfers       Dates: Start: 04/02/22         Goal: STG-Within one week, patient will perform bathroom transfers with supervision and use of AE as needed.       Dates: Start: 04/02/22               Problem: OT Long Term Goals       Dates: Start: 04/02/22         Goal: LTG-By discharge, patient will complete basic self care tasks with modified independence and use of AE as needed.       Dates: Start: 04/02/22            Goal: LTG-By discharge, patient will perform bathroom transfers with modified independence and use of AE as needed.       Dates: Start: 04/02/22            " Goal: LTG-By discharge, patient will complete basic home management with setup and supervision.        Dates: Start: 04/02/22               Problem: Toileting       Dates: Start: 04/02/22         Goal: STG-Within one week, patient will complete toileting tasks with supervision and use of AE as needed.       Dates: Start: 04/02/22

## 2022-04-04 NOTE — THERAPY
"Occupational Therapy  Daily Treatment     Patient Name: Juan Lantigua  Age:  46 y.o., Sex:  male  Medical Record #: [id number]  Today's Date: 4/4/2022     Precautions  Precautions: (P) Fall Risk,Non Weight Bearing Left Lower Extremity         Subjective    \" I decided I am going to leave tomorrow. I didn't have to come here.  I am doing better than alot of  people here and  my bed could go to someone who needs it.\"         Objective       04/04/22 1031   Precautions   Precautions Fall Risk;Non Weight Bearing Left Lower Extremity   Interdisciplinary Plan of Care Collaboration   IDT Collaboration with  Family / Caregiver;Other (See Comments)  (prosthetist rep)   Patient Position at End of Therapy Seated;Call Light within Reach;Tray Table within Reach;Family / Friend in Room   Collaboration Comments prosthetist  in at start of session  issued  limb protector  collabaorated with Niurka Purcell  educating patient regarding   prosthesis  process.     spouse also present     performed car transfer with patient at FWW level and CGA .  no cues needed from therapist     therpist performed transfer with the patient  while spouse observed prior to having them perform the task on their own.   OT Total Time Spent   OT Individual Total Time Spent (Mins) 30   OT Charge Group   OT Therapy Activity 2      Educated patient regarding  benefits   of completing rehab stay  to recommended d/c date ( to be decided  at conf. On 4-5-22) . Better chance of long term success and decreased risk of fall   with long length of stay.     Reports that ramp will be complete tomorrow.    Will follow up regarding  tub bench  during PM tx     Assessment    Determined to return home  prior to the IDT  determining recommended length of stay     spouse required cues to attend to  Observation of  Car transfer  In order to prepare for hands on practice.   Reports she will be here all day  Will continue training in PM to include  Stump wrapping  IPOP mgmt and " and waterproofing of LE for bathing       Strengths: Able to follow instructions,Adequate strength,Alert and oriented,Effective communication skills,Good insight into deficits/needs,Independent prior level of function,Manages pain appropriately,Motivated for self care and independence,Pleasant and cooperative,Supportive family,Willingly participates in therapeutic activities  Barriers: Impaired balance,Limited mobility,Pain    Plan    Focus on functional tasks  and training with  Spouse in PM        Occupational Therapy Goals (Active)       Problem: Bathing       Dates: Start: 04/02/22         Goal: STG-Within one week, patient will bathe with modified independence.        Dates: Start: 04/02/22               Problem: Dressing       Dates: Start: 04/02/22         Goal: STG-Within one week, patient will dress LB with supervision and use of AE as needed.       Dates: Start: 04/02/22               Problem: Functional Transfers       Dates: Start: 04/02/22         Goal: STG-Within one week, patient will perform bathroom transfers with supervision and use of AE as needed.       Dates: Start: 04/02/22               Problem: OT Long Term Goals       Dates: Start: 04/02/22         Goal: LTG-By discharge, patient will complete basic self care tasks with modified independence and use of AE as needed.       Dates: Start: 04/02/22            Goal: LTG-By discharge, patient will perform bathroom transfers with modified independence and use of AE as needed.       Dates: Start: 04/02/22            Goal: LTG-By discharge, patient will complete basic home management with setup and supervision.        Dates: Start: 04/02/22               Problem: Toileting       Dates: Start: 04/02/22         Goal: STG-Within one week, patient will complete toileting tasks with supervision and use of AE as needed.       Dates: Start: 04/02/22

## 2022-04-04 NOTE — DISCHARGE PLANNING
CM met with patient and his family. Patient wants to discharge home on 4/5/22. Explained that the team needs to have the IDT. He said he is ready to go and will leave on 4/5/22. Notified team and the attending. Ordered outpatient PT through [facility]. Ordered FWW and wheelchair from [facility]. Scheduled his PCP appointment with a new provider as his old one is no longer practicing. Patient will be staying with his cousin in [place] so [facility]. Also scheduled new PCP in [place], [place]. Insurance will cover the wheelchair and the tub transfer bench so patient will go to [facility] to purchase a FWW.

## 2022-04-04 NOTE — CARE PLAN
Problem: Knowledge Deficit - Standard  Goal: Patient and family/care givers will demonstrate understanding of plan of care, disease process/condition, diagnostic tests and medications  Outcome: Progressing     Problem: Pain - Standard  Goal: Alleviation of pain or a reduction in pain to the patient’s comfort goal  Outcome: Progressing     Problem: Fall Risk - Rehab  Goal: Patient will remain free from falls  Outcome: Progressing   The patient is Stable - Low risk of patient condition declining or worsening    Shift Goals  Clinical Goals: safety  Patient Goals: pain control

## 2022-04-04 NOTE — THERAPY
Physical Therapy   Daily Treatment     Patient Name: Juan Lantigua  Age:  46 y.o., Sex:  male  Medical Record #: [id number]  Today's Date: 4/4/2022     Precautions  Precautions: Fall Risk,Non Weight Bearing Left Lower Extremity    Subjective    Pt agreeable to amb with family present     Objective     04/04/22 1101   Gait Functional Level of Assist    Gait Level Of Assist Contact Guard Assist   Assistive Device Front Wheel Walker   Distance (Feet) 125   # of Times Distance was Traveled 2   Deviation   (hop to pattern, cues for posture)   Bed Mobility    Sit to Stand Standby Assist   Interdisciplinary Plan of Care Collaboration   IDT Collaboration with  Family / Caregiver;;Certified O.T. Assistant  (ANDERSEN)   Patient Position at End of Therapy Seated;Self Releasing Lap Belt Applied;Family / Friend in Room   Collaboration Comments DC planning, equipment   PT Total Time Spent   PT Individual Total Time Spent (Mins) 30   PT Charge Group   PT Gait Training 1   PT Therapeutic Activities 1   Supervising Physical Therapist Zulema Harmon       Patients wife, Yani completed barber on caregiver training for amb with FWW, he was instructed on use of gait belt and proximity/positioning in relaation to the patient when amb. We discussed dc tomorrow at [facility] level vs staing at rehab a bit longer to progress to Yelitza level.    Recommending wc for community mobility abs HH as well as FWW for HH distances     Reviewed passport and discussed dc equipment and needs      Assessment    The patient has been edu on importance of continued HEP after DC, safety with mobility, BKA education and assistive equipment.   Strengths: Able to follow instructions,Adequate strength,Alert and oriented,Effective communication skills,Independent prior level of function,Motivated for self care and independence,Pleasant and cooperative,Willingly participates in therapeutic activities  Barriers: Limited mobility,Pain,Fatigue     Plan    floor recovery,  cont residual limb wrapping and care, consider mirror therapy for residual limb pain, floor recovery, car transfer. Prepare for DC     Passport items to be completed:  Get in/out of bed safely, in/out of a vehicle, safely use mobility device, walk or wheel around home/community, navigate up and down stairs, show how to get up/down from the ground, ensure home is accessible, demonstrate HEP, complete caregiver training    Physical Therapy Problems (Active)       Problem: Balance       Dates: Start: 04/02/22         Goal: STG-Within one week, patient will maintain dynamic standing x 5 min with FWW, SBA       Dates: Start: 04/02/22               Problem: Mobility       Dates: Start: 04/02/22         Goal: STG-Within one week, patient will propel wheelchair community distances  Lucinda over indoor surfaces, supervised outdoor surfaces, >300ft       Dates: Start: 04/02/22            Goal: STG-Within one week, patient will ambulate household distance with FWW, SBA, 150ft.       Dates: Start: 04/02/22            Goal: STG-Within one week, patient will ambulate up/down a curb with FWW, SBA       Dates: Start: 04/02/22               Problem: Mobility Transfers       Dates: Start: 04/02/22         Goal: STG-Within one week, patient will transfer bed to chair supervised with LRAD       Dates: Start: 04/02/22               Problem: PT-Long Term Goals       Dates: Start: 04/02/22         Goal: LTG-By discharge, patient will tolerate standing x 10 minutes with FWW, Lucinda, pain <3/10       Dates: Start: 04/02/22            Goal: LTG-By discharge, patient will ambulate x 200 ft with FWW, Lucinda       Dates: Start: 04/02/22            Goal: LTG-By discharge, patient will transfer one surface to another Lucinda with LRAD       Dates: Start: 04/02/22            Goal: LTG-By discharge, patient will perform home exercise program Lucinda for stretching/strengthening L residual limb       Dates: Start: 04/02/22            Goal: LTG-By discharge,  patient will ambulate up/down 4-6 stairs, SBA with LRAD/railings.       Dates: Start: 04/02/22

## 2022-04-04 NOTE — PROGRESS NOTES
Handoff shift report given to RN's Johnathan. POC discussed. Pt is sitting up in bed talking on the phone w/ no s/s distress noted. Call light and bedside table w/in reach.

## 2022-04-04 NOTE — THERAPY
"Recreational Therapy   Initial Evaluation     Patient Name: Juan Lantigua  Age:  46 y.o., Sex:  male  Medical Record #: [id number]  Today's Date: 4/4/2022     Subjective    Pt sharing that his only leisure activity is fishing. \"It's kind of sad.\" Pt sharing this after not having a more diverse leisure plan.       Objective       04/04/22 0901   Functional Ability Status - Cognitive   Attention Span Remains on Task   Comprehension Follows Three Step Commands   Judgment Able to Make Independent Decisions   Functional Ability Status - Emotional    Affect Appropriate;Bright   Mood Appropriate   Behavior Appropriate   Leisure Competence Measure   Leisure Awareness Independent   Leisure Attitude Cueing Assist   Leisure Skills Minimal Assist   Cultural / Social Behaviors Independent   Interpersonal Skills Independent   Community Integration Skills Minimal Assist   Social Contact Independent   Community Participation Minimal Assist   Interdisciplinary Plan of Care Collaboration   IDT Collaboration with  Physical Therapist Assistant (PTA);Physician;Nursing   Patient Position at End of Therapy In Bed;Tray Table within Reach;Phone within Reach;Call Light within Reach   Collaboration Comments PTA CLOF   Strengths & Barriers   Strengths Alert and oriented;Motivated for self care and independence;Pleasant and cooperative;Supportive family;Willingly participates in therapeutic activities   Barriers Decreased endurance;Pain  (R BKA)   Treatment Time   Total Time Spent (mins) 15   Total Time Missed 15   Reasons for Time Missed Medical-Patient With Nursing  (with physician)   Procedural Tracking   Procedural Tracking Community Re-Integration;Community Skills Development;Leisure Skills Awareness;Leisure Skills Development       Assessment  Patient is 46 y.o. male with a diagnosis of S/P BKA (below knee amputation) unilateral, left .  Additional factors influencing patient status / progress (ie: cognitive factors, co-morbidities, social " support, etc): no known PMHx and no previous care from a primary care doctor who presented to [facility] on 3/26/22 with a severe left foot infection. Per documentation, patient was evaluated in the emergency department and found to have gangrene and acute osteomyelitis and gas-forming infection in his left foot.  At time of admission patient had evidence of sepsis with heart rate of 130, WBC 30, lactic acid 2.8, ESR and CRP elevated to 19.5 and 9 respectively.  Patient was empirically started on Unasyn and vancomycin for sepsis.  Orthopedic surgery was consulted (Dr. Larkin) and patient was taken to the OR on 3/27 for a left below the knee amputation performed by Dr. Larkin.  Postoperatively patient's pain was controlled with Tylenol and oxycodone.  Patient's leukocytosis improved to 13.  Patient's antibiotics were discontinued after recommendations from infectious diseases (Dr. Jiménez).  Of note, during patient's hospitalization there was an incidental finding of a fat-containing pelvic mass with thick septations measuring 6.4 x 6.1 x 5.6 cm, discharge summary reveals that the differential for this patient's pelvic mass is a lipoma although liposarcoma could not be ruled out due to thick septations.  Patient has a new diagnosis of uncontrolled diabetes.  His hemoglobin A1c is 10.3.  He has been transferred to [facility] rehab on Lantus 10 units nightly and sliding scale insulin.  Functionally patient has been able to participate with therapy.  He is functioning at a SBA for stand pivot transfers, and he was able to ambulate 20 ft CGA with FWW. .        Plan  Recommend Recreational Therapy 30 minutes per day 2-3 days per week for 2 weeks for the following treatments:  Community Re-Integration, Community Skills Development, Leisure Skills Awareness, Leisure Skills Development and Gross Motor Functional Leisure Skills    Passport items to be completed:  Verbalize two positive leisure activities, discuss returning  to work, hobbies, community groups or volunteer activities, explore community resources     Goals:  Long term and short term goals have been discussed with patient and they are in agreement.    Recreation Therapy Problems (Active)       Problem: Recreation Therapy       Dates: Start: 04/04/22         Goal: STG-Within one week, patient will demonstrate leisure problem solving by sharing on two positive lesiure activities they would like to participate in either in session or during their free time and any perceived barriers to their participation.       Dates: Start: 04/04/22            Goal: STG-Within one week, patient will demonstrate a standing tolerance during a leisure activity that involves weight shift and standing for 3 minutes x3 with no LOB CGA.        Dates: Start: 04/04/22            Goal: LTG-By discharge, patient will demonstrate leisure problem solving by sharing on two positive lesiure activities they would like to participate in post dc and any perceived barriers to their participation.       Dates: Start: 04/04/22            Goal: LTG-By discharge, patient will demonstrate a standing tolerance during a leisure activity that involves weight shift and standing for 5 minutes x2 with no LOB CGA.        Dates: Start: 04/04/22

## 2022-04-05 ENCOUNTER — TELEPHONE (OUTPATIENT)
Dept: SCHEDULING | Facility: IMAGING CENTER | Age: 47
End: 2022-04-05

## 2022-04-05 VITALS
TEMPERATURE: 98 F | BODY MASS INDEX: 25.72 KG/M2 | OXYGEN SATURATION: 99 % | SYSTOLIC BLOOD PRESSURE: 115 MMHG | DIASTOLIC BLOOD PRESSURE: 72 MMHG | RESPIRATION RATE: 18 BRPM | HEIGHT: 70 IN | WEIGHT: 179.68 LBS | HEART RATE: 96 BPM

## 2022-04-05 LAB
EST. AVERAGE GLUCOSE BLD GHB EST-MCNC: 223 MG/DL
GLUCOSE BLD STRIP.AUTO-MCNC: 200 MG/DL (ref 65–99)
GLUCOSE BLD STRIP.AUTO-MCNC: 210 MG/DL (ref 65–99)
HBA1C MFR BLD: 9.4 % (ref 4–5.6)

## 2022-04-05 PROCEDURE — 97535 SELF CARE MNGMENT TRAINING: CPT | Mod: CO

## 2022-04-05 PROCEDURE — 97116 GAIT TRAINING THERAPY: CPT | Mod: CQ

## 2022-04-05 PROCEDURE — A9270 NON-COVERED ITEM OR SERVICE: HCPCS | Performed by: PHYSICAL MEDICINE & REHABILITATION

## 2022-04-05 PROCEDURE — 82962 GLUCOSE BLOOD TEST: CPT | Mod: 91

## 2022-04-05 PROCEDURE — 92523 SPEECH SOUND LANG COMPREHEN: CPT

## 2022-04-05 PROCEDURE — 97530 THERAPEUTIC ACTIVITIES: CPT | Mod: CO

## 2022-04-05 PROCEDURE — 700102 HCHG RX REV CODE 250 W/ 637 OVERRIDE(OP): Performed by: PHYSICAL MEDICINE & REHABILITATION

## 2022-04-05 PROCEDURE — 700102 HCHG RX REV CODE 250 W/ 637 OVERRIDE(OP): Performed by: HOSPITALIST

## 2022-04-05 PROCEDURE — 99239 HOSP IP/OBS DSCHRG MGMT >30: CPT | Performed by: PHYSICAL MEDICINE & REHABILITATION

## 2022-04-05 PROCEDURE — A9270 NON-COVERED ITEM OR SERVICE: HCPCS | Performed by: HOSPITALIST

## 2022-04-05 RX ORDER — LISINOPRIL 10 MG/1
10 TABLET ORAL DAILY
Qty: 30 TABLET | Refills: 0 | Status: SHIPPED | OUTPATIENT
Start: 2022-04-06

## 2022-04-05 RX ORDER — INSULIN GLARGINE 100 [IU]/ML
10 INJECTION, SOLUTION SUBCUTANEOUS EVERY EVENING
Qty: 3 ML | Refills: 0 | Status: SHIPPED | OUTPATIENT
Start: 2022-04-05 | End: 2022-05-05

## 2022-04-05 RX ORDER — ACETAMINOPHEN 325 MG/1
650 TABLET ORAL EVERY 4 HOURS PRN
Qty: 30 TABLET | Refills: 0 | Status: SHIPPED | OUTPATIENT
Start: 2022-04-05

## 2022-04-05 RX ORDER — GABAPENTIN 300 MG/1
300 CAPSULE ORAL 3 TIMES DAILY
Qty: 90 CAPSULE | Refills: 0 | Status: SHIPPED | OUTPATIENT
Start: 2022-04-05

## 2022-04-05 RX ORDER — ERGOCALCIFEROL 1.25 MG/1
50000 CAPSULE ORAL
Qty: 7 CAPSULE | Refills: 0 | Status: SHIPPED | OUTPATIENT
Start: 2022-04-09

## 2022-04-05 RX ORDER — GABAPENTIN 300 MG/1
300 CAPSULE ORAL 3 TIMES DAILY
Status: DISCONTINUED | OUTPATIENT
Start: 2022-04-05 | End: 2022-04-05

## 2022-04-05 RX ORDER — GABAPENTIN 300 MG/1
300 CAPSULE ORAL 3 TIMES DAILY
Status: DISCONTINUED | OUTPATIENT
Start: 2022-04-05 | End: 2022-04-05 | Stop reason: HOSPADM

## 2022-04-05 RX ORDER — HYDROCODONE BITARTRATE AND ACETAMINOPHEN 5; 325 MG/1; MG/1
1 TABLET ORAL EVERY 6 HOURS PRN
Qty: 28 TABLET | Refills: 0 | Status: SHIPPED | OUTPATIENT
Start: 2022-04-05 | End: 2022-04-12

## 2022-04-05 RX ORDER — POLYETHYLENE GLYCOL 3350 17 G/17G
17 POWDER, FOR SOLUTION ORAL
Qty: 10 EACH | Refills: 3 | Status: SHIPPED | OUTPATIENT
Start: 2022-04-05 | End: 2022-04-13

## 2022-04-05 RX ADMIN — OMEPRAZOLE 20 MG: 20 CAPSULE, DELAYED RELEASE ORAL at 07:58

## 2022-04-05 RX ADMIN — ACETAMINOPHEN 650 MG: 325 TABLET ORAL at 05:38

## 2022-04-05 RX ADMIN — LISINOPRIL 10 MG: 5 TABLET ORAL at 05:39

## 2022-04-05 RX ADMIN — OXYCODONE 5 MG: 5 TABLET ORAL at 06:01

## 2022-04-05 RX ADMIN — OXYCODONE 5 MG: 5 TABLET ORAL at 02:04

## 2022-04-05 RX ADMIN — GABAPENTIN 300 MG: 300 CAPSULE ORAL at 10:52

## 2022-04-05 RX ADMIN — ACETAMINOPHEN 650 MG: 325 TABLET ORAL at 00:46

## 2022-04-05 ASSESSMENT — ACTIVITIES OF DAILY LIVING (ADL)
TOILET_TRANSFER_LEVEL_OF_ASSIST: ABLE TO COMPLETE TOILET TRANSFER WITHOUT ASSIST
SHOWER_TRANSFER_LEVEL_OF_ASSIST: REQUIRES PHYSICAL ASSIST WITH SHOWER TRANSFER
TOILETING_LEVEL_OF_ASSIST: ABLE TO COMPLETE TOILETING WITHOUT ASSIST

## 2022-04-05 NOTE — PROGRESS NOTES
"Pt. refused Left BKA assessment/dressing change. \"It is clean, they changed yesterday\". Education was provided regarding the importance of wound assessment. Pt. still refused.    "

## 2022-04-05 NOTE — CARE PLAN
Problem: OT Long Term Goals  Goal: LTG-By discharge, patient will perform bathroom transfers with modified independence and use of AE as needed.  Outcome: Not Met  Note: W/c level   CGA  for safety     Goal: LTG-By discharge, patient will complete basic home management with setup and supervision.   Outcome: Not Met  Note: Not addressed left prior to recommended d/c date

## 2022-04-05 NOTE — PROGRESS NOTES
"0046 pt called asking if he could have his scheduled midnight Tylenol, medication administered. Pain level 7/10 on left stump, left foot phantom pain. Will continue to monitor.    0204 medicated with Oxycodone 5 mg, pt stated he has not slept at all d/t pain in left stump,phantom foot pain 9/10. Offered to re wrap dressing and reposition affected leg, ice pack offered but pt refused. Pt stated \"I wish I could smoke weed\", pt aware he is not allowed to smoke weed while in the facility. Will continue to monitor.  "

## 2022-04-05 NOTE — PROGRESS NOTES
Patient discharged to home per order.  Discharge instructions reviewed with patient and wife; they verbalize understanding and signed copies placed in chart.  Patient has all belongings; signed copy of form in chart.  Patient left facility at 1211 via crutches accompanied by rehab staff and relatives.

## 2022-04-05 NOTE — CARE PLAN
Problem: Balance  Goal: STG-Within one week, patient will maintain dynamic standing x 5 min with FWW, SBA  Outcome: Not Met     Problem: Mobility  Goal: STG-Within one week, patient will propel wheelchair community distances  [place] over indoor surfaces, supervised outdoor surfaces, >300ft  Outcome: Not Met  Note: Indoor only  Goal: STG-Within one week, patient will ambulate household distance with FWW, SBA, 150ft.  Outcome: Not Met  Goal: STG-Within one week, patient will ambulate up/down a curb with FWW, SBA  Outcome: Not Met     Problem: Mobility Transfers  Goal: STG-Within one week, patient will transfer bed to chair supervised with LRAD  Outcome: Met

## 2022-04-05 NOTE — PROGRESS NOTES
"Offered diabetes education to patient prior to discharge, as patient will be taking insulin. Pt refused education, stating, \"I have taken insulin before, just give me the sliding scale thing.\" Provided handouts on insulin administration, recognition and treatment of hypoglycemia, and [facility] diabetes education packet.  "

## 2022-04-05 NOTE — DISCHARGE SUMMARY
Rehab Discharge Summary    Admission Date: 4/1/2022    Discharge Date: 4/5/2022    Attending Physician: Juanita Friedman DO (Guillermo Shore DO, covering)    Admission Diagnosis:   Active Hospital Problems    Diagnosis    • *S/P BKA (below knee amputation) unilateral, left (HCC)    • Vitamin D deficiency    • Thrombocytosis    • Alkaline phosphatase elevation    • Anemia    • Leukocytosis    • Hypertension    • Diabetic neuropathy associated with type 2 diabetes mellitus (HCC)    • DM (diabetes mellitus) (HCC)    • Peripheral arterial disease (HCC)        Discharge Diagnosis:  Active Hospital Problems    Diagnosis    • *S/P BKA (below knee amputation) unilateral, left (HCC)    • Vitamin D deficiency    • Thrombocytosis    • Alkaline phosphatase elevation    • Anemia    • Leukocytosis    • Hypertension    • Diabetic neuropathy associated with type 2 diabetes mellitus (HCC)    • DM (diabetes mellitus) (HCC)    • Peripheral arterial disease (HCC)      Discharge physical examination  General: well-groomed sitting up in no acute distress, no visitors present, sitting on wheelchair  Eyes: no scleral icterus or conjunctival injection  Ears, nose, mouth and throat: moist oral mucosa  Cardiovascular:  good peripheral perfusion  Respiratory: breathing room air comfortably without use of accessory muscles  Gastrointestinal: nondistended  Genitourinary: no indwelling estrada  Musculoskeletal: +L BKA with IPOP in place  Skin: no wounds seen on exposed skin    Neurologic:  Mental status:  A&Ox4 (person, place, date, situation) answers questions appropriately follows commands  Speech: fluent, no aphasia or dysarthria  No clonus at right ankles  Tone: no spasticity noted  Psychiatric: appropriate affect  Hematologic/lymphatic/immunologic: no bruises seen on exposed skin    HPI per H&P:  Mr. Juan Lantigua is a 47 yo M with no known PMHx and no previous care from a primary care doctor who presented to [facility] on  3/26/22 with a severe left foot infection. Per documentation, patient was evaluated in the emergency department and found to have gangrene and acute osteomyelitis and gas-forming infection in his left foot.  At time of admission patient had evidence of sepsis with heart rate of 130, WBC 30, lactic acid 2.8, ESR and CRP elevated to 19.5 and 9 respectively.  Patient was empirically started on Unasyn and vancomycin for sepsis.  Orthopedic surgery was consulted (Dr. Larkin) and patient was taken to the OR on 3/27 for a left below the knee amputation performed by Dr. Larkin.  Postoperatively patient's pain was controlled with Tylenol and oxycodone.  Patient's leukocytosis improved to 13.  Patient's antibiotics were discontinued after recommendations from infectious diseases (Dr. Jiménez).  Of note, during patient's hospitalization there was an incidental finding of a fat-containing pelvic mass with thick septations measuring 6.4 x 6.1 x 5.6 cm, discharge summary reveals that the differential for this patient's pelvic mass is a lipoma although liposarcoma could not be ruled out due to thick septations.  Patient has a new diagnosis of uncontrolled diabetes.  His hemoglobin A1c is 10.3.  He has been transferred to [facility] on Lantus 10 units nightly and sliding scale insulin.  Functionally patient has been able to participate with therapy.  He is functioning at a SBA for stand pivot transfers, and he was able to ambulate 20 ft CGA with FWW.     Patient was admitted to [facility] on 4/1/2022.     Hospital Course by Problem List:  Patient's primary barrier to rehabilitation was his phantom limb pain. During his stay here, gabapentin was increased. He was progressing with ADL and mobility and patient felt he would improve better in a home environment. An IDT conference was held on 4/5/2022 and noted that patient was contact guard to supervision with his ADLs and mobility so agreed that patient was safe to  discharge home with assistance on 4/5/2022 but given the discharge was not planned for 4/5, equipment would not be ready so patient was evaluated by PT with his self-supplied crutches and did well. Patient was offered diabetic counseling including how to administer insulin but patient politely declined. Patient was also offered further education on left residual limb wound management but patient politely declined. Patient to follow up with ortho and PM&R and PCP.    L BKA: Patient demonstrates functional deficits in strength, balance, coordination, and ADL's. Patient is admitted to [facility] for comprehensive rehabilitation therapy for his left BKA.        Nutrition/Dysphagia: Dietician monitors nutrient intake, recommend supplements prn and provide nutrition education to pt/family to promote optimal nutrition for wound healing/recovery.      Skin/dermal ulcer prophylaxis: Monitor for new skin conditions with q.2 h. turns as required to prevent the development of skin breakdown.      Cognition/Behavior: As needed psychologist provides adjustment counseling to illness and psychosocial barriers that may be potential barriers to rehabilitation.      Respiratory therapy: RT performs O2 management prn, breathing retraining, pulmonary hygiene and bronchospasm management prn to optimize participation in therapies.      L BKA  - LLE gangrene wound infection s/p L BKA on 3/27 performed by Dr. Larkin   - will need , rigid removable dressing, and residual limb board on WC   - establish with P&O  - follow up with outpatient PM&R and follow up with Ortho (Dr. Larkin)      HTN   Continue lisinopril 10mg qday      Uncontrolled DM with hyperglycemia  - new diagnosis for patient per documentation, patient reports having used insulin in the past and has all the supplies for it  - Continue Lantus 12 units QHS and SSI   - will need diabetes education before DC   Hospitalist managing     ABLA  -Hb  10.4  -Follow-up hemoglobin on admission labs tomorrow     Adjustment disorder  -Patient reporting feeling that his mood is down since his amputation  - Rehab psychology following     Postsurgical pain  Phantom limb pain  - Continue Tylenol 650mg Q6hr, Oxycodone 5mg Q4hr PRN pain, gabapentin 300mg qhs      Bowel  - constipation prevention with scheduled senna   - bowel meds PRN      Bladder   - timed void to prevent falls and incont episodes       GI prophylaxis:  On prilosec to prevent gastritis/dyspepsia which may interfere with therapies.     DVT prophylaxis:  Patient is on Lovenox SC 40mg daily for anticoagulation upon transfer. Encourage OOB. Monitor daily for signs and symptoms of DVT including but not limited to swelling and pain to prevent the development of DVT that may interfere with therapies.    CODE: full resuscitation    Functional Status at Discharge  Eating:  Independent  Eating Description:     Grooming:  Independent  Grooming Description:  Initial preparation for task,Seated in wheelchair at sink  Bathing:  Supervision (sponge bathe at edge of bed  decliend shower due to fatigue  / lack of sleep night prior)  Bathing Description:  Grab bar,Hand held shower,Tub bench,Initial preparation for task,Set up for wound protection  Upper Body Dressing:  Modified Independent  Upper Body Dressing Description:  Initial preparation for task  Lower Body Dressing:  Modified Independent (doff/don  shorts  sock shoe   residual limb protector)  Lower Body Dressing Description:  Initial preparation for task,Set-up of equipment,Supervision for safety     Walk:  Contact Guard Assist  Distance Walked:  125  Number of Times Distance Was Traveled:  2  Assistive Device:  Front Wheel Walker  Gait Deviation:   (hop to pattern, cues for posture)  Wheelchair:  Supervised  Distance Propelled:  125x2   Wheelchair Description:  Supervision for safety,Leg rest management  Stairs Contact Guard Assist  Stairs Description  Walker,Verbal cueing,Supervision for safety     Comprehension:     Comprehension Description:     Expression:     Expression Description:     Social Interaction:     Social Interaction Description:     Problem Solving:     Problem Solving Description:     Memory:     Memory Description:          Guillermo FLOYD D.O., personally performed a complete drug regimen review and no potential clinically significant medication issues were identified.     Discharge Medication:     Medication List      START taking these medications      Instructions   acetaminophen 325 MG Tabs  Commonly known as: Tylenol   Take 2 Tablets by mouth every four hours as needed.  Dose: 650 mg     gabapentin 300 MG Caps  Commonly known as: NEURONTIN   Take 1 Capsule by mouth 3 times a day.  Dose: 300 mg     HYDROcodone-acetaminophen 5-325 MG Tabs per tablet  Commonly known as: Norco   Take 1 Tablet by mouth every 6 hours as needed for up to 7 days.  Dose: 1 Tablet     insulin regular 100 Unit/mL Soln  Commonly known as: HumuLIN R   Inject 2-12 Units under the skin 4 Times a Day,Before Meals and at Bedtime.  Dose: 2-12 Units     Lantus SoloStar 100 UNIT/ML Sopn injection  Generic drug: insulin glargine  Replaces: LANTUS SC   Inject 10 Units under the skin every evening for 30 days.  Dose: 10 Units     lisinopril 10 MG Tabs  Start taking on: April 6, 2022  Commonly known as: PRINIVIL   Take 1 Tablet by mouth every day.  Dose: 10 mg     polyethylene glycol/lytes 17 g Pack  Commonly known as: MIRALAX   Take 1 Packet by mouth 1 time a day as needed (if sennosides and docusate ineffective after 24 hours).  Dose: 17 g     vitamin D2 (Ergocalciferol) 1.25 MG (56084 UT) Caps capsule  Start taking on: April 9, 2022  Commonly known as: Marv   Doctor's comments: Will need Vit D 25OH level rechecked after completion of Rx for reevaluation of Vit D supplementation needs  Take 1 Capsule by mouth every 7 days.  Dose: 50,000 Units        STOP taking  these medications    LANTUS SC  Replaced by: Lantus SoloStar 100 UNIT/ML Sopn injection     Lyrica 100 MG Caps  Generic drug: pregabalin     oxyCODONE-acetaminophen 5-325 MG Tabs  Commonly known as: PERCOCET            Discharge Diet:  ADA diet    Discharge Activity:  nonweight bearing on left lower limb with knee immobilizer  Walk with front wheel walker  Tub bench    Disposition:  Patient to discharge home with family support and community resources.     Equipment:  Wheelchair with residual limb rest  Front wheel walker    Follow-up & Discharge Instructions:  Follow up with your primary care provider (PCP) within 7-10 days of discharge to review your medications and take over your care.     If you develop chest pain, fever, chills, change in neurologic function (weakness, sensation changes, vision changes), or other concerning sxs, seek immediate medical attention or call 911.      Condition on Discharge:  Good    More than 35 minutes was spent on discharging this patient, including face-to-face time, prescription management, and the dictation of this note.    Guillermo Shore D.O.    Date of Service: 4/5/2022

## 2022-04-05 NOTE — THERAPY
Speech Language Pathology   Initial Assessment     Patient Name: Juan Lantigua  AGE:  46 y.o., SEX:  male  Medical Record #: [id number]  Today's Date: 4/5/2022     Subjective    Patient was willing to participate in cognitive linguistic evaluation. Reports IPLOF, works intermittently, lives with spouse. Patient reports mild difficulties with memory, but states is is overwhelmed by recent BKA and has a hard time focusing when medicated for pain.      Objective       04/05/22 0832   Prior Living Situation   Prior Services None;Home-Independent   Housing / Facility 3 Story House   Lives with - Patient's Self Care Capacity Spouse   Prior Level Of Function   Communication Within Functional Limits   Swallow Within Functional Limits   Vision Wears Corrective Lenses   Patient's Primary Language English   Occupation (Pre-Hospital Vocational) Not Employed   Functional Level of Assist   Comprehension Modified Independent   Expression Independent   Social Interaction Supervision   Problem Solving Modified Independent   Memory Supervision   Outcome Measures   Outcome Measures Utilized SCCAN   SCCAN (Scales of Cognitive and Communicative Ability for Neurorehabilitation)   Oral Expression - Raw Score 19   Oral Expression - Scale Performance Score 100   Orientation - Raw Score 12   Orientation - Scale Performance Score 100   Memory - Raw Score 15   Memory - Scale Performance Score 79   Speech Comprehension - Raw Score 13   Speech Comprehension - Scale Performance Score 100   Reading Comprehension - Raw Score 10   Reading Comprehension - Scale Performance Score 83   Writing - Raw Score 7   Writing - Scale Performance Score 100   Attention - Raw Score 15   Attention - Scale Performance Score 94   Problem Solving - Raw Score 20   Problem Solving - Scale Performance Score 87   SCCAN Total Raw Score 86   SCCAN Degree of Severity Mild Impairment   Current Discharge Plan   Current Discharge Plan Return to Prior Living Situation   Benefit    Therapy Benefit Patient would not benefit from Inpatient Rehab Speech-Language Pathology.  No further Speech Therapy recommended at this time.   Speech Language Pathologist Assigned   Assigned SLP / Extension NO ST   SLP Total Time Spent   SLP Individual Total Time Spent (Mins) 60   Evaluation Charges   SLP Speech Language Evaluation Speech Sound Language Comprehension       Assessment    Patient is 46 y.o. male with a diagnosis of BKA.  Additional factors influencing patient status/progress (ie: cognitive factors, co-morbidities, social support, etc): IPLOF history of diabetes.    Patient participated in cognitive linguistic evaluation. SCCAN was completed with a final raw score of 86 indicating mild cognitive deficits (cut-off for typical functioning is 87). Mild difficulties noted in the areas of memory (79%). Patient feels that difficulties are related to stress and medication and does not wish to participate in speech therapy to address cognition at this time. Patient states he will be leaving rehab hospital today despite upcoming team conference recommendations. Provided education regarding OP speech therapy services if mild memory deficits do not improve.     Plan  Speech therapy is not recommended at this time. Patient may benefit from OP speech therapy to address mild memory deficit.       Goals:  Long term and short term goals have been discussed with patient and they are in agreement.    Speech Therapy Problems (Active)       There are no active problems.

## 2022-04-05 NOTE — PROGRESS NOTES
Pt refused Tylenol at midnight, was awake upset,talking and arguing with spouse. I repeated if he wanted his Tylenol, again refused.

## 2022-04-05 NOTE — CARE PLAN
Problem: Fall Risk - Rehab  Goal: Patient will remain free from falls  Outcome: Progressing     Problem: Psychosocial  Goal: Patient and family will demonstrate ability to cope with life altering diagnosis and/or procedure  Flowsheets  Taken 4/5/2022 0306  Family Behaviors:   Agitated   Aggressive Verbally   Angry   Irritable  Taken 4/4/2022 2000  Patient Behaviors:   Angry   Aggressive Verbally   Agitated   Non Compliant   Uncooperative   Irritable   Hostile  Note: Patient has been yelling and cussing at staff. Patient is very verbally abusive. CNA notified nurse that patient was being very hostile due to CNA wanting to make sure it was okay for patient to have a snack due to hx of diabetes. Patient was upset that staff wanted to check his blood sugar. Patient was refusing to have his blood sugar checked by writer. Charge RN Anya heard patient yelling from across the hospital and agreed to take over care. Patient has been heard throughout the hospital yelling on the phone with his wife. Wife was heard yelling through the phone at the CNA.   The patient is Stable - Low risk of patient condition declining or worsening

## 2022-04-05 NOTE — DISCHARGE INSTRUCTIONS
[facility] NURSING DISCHARGE INSTRUCTIONS    Blood Pressure: 110/71  Weight: 81.5 kg (179 lb 10.8 oz)  Nursing recommendations for Juan Lantigua at time of discharge are as follows:  Client verbalized understanding of all discharge instructions and prescriptions.     Review all your home medications and newly ordered medications with your doctor and/or pharmacist. Follow medication instructions as directed by your doctor and/or pharmacist.    Pain Management:   Discharge Pain Medication Instructions:  Comfort Goal: Perform Activity,Sleep Comfortably,Comfort with Movement,Stay Alert  Notify your primary care provider if pain is unrelieved with these measures, if the pain is new, or increased in intensity.    Discharge Skin Characteristics: Warm,Dry  Discharge Skin Exam: Clear    Hyperglycemia  Hyperglycemia is when the sugar (glucose) level in your blood is too high. It may not cause symptoms. If you do have symptoms, they may include warning signs, such as:  · Feeling more thirsty than normal.  · Hunger.  · Feeling tired.  · Needing to pee (urinate) more than normal.  · Blurry eyesight (vision).  You may get other symptoms as it gets worse, such as:  · Dry mouth.  · Not being hungry (loss of appetite).  · Fruity-smelling breath.  · Weakness.  · Weight gain or loss that is not planned. Weight loss may be fast.  · A tingling or numb feeling in your hands or feet.  · Headache.  · Skin that does not bounce back quickly when it is lightly pinched and released (poor skin turgor).  · Pain in your belly (abdomen).  · Cuts or bruises that heal slowly.  High blood sugar can happen to people who do or do not have diabetes. High blood sugar can happen slowly or quickly, and it can be an emergency.  Follow these instructions at home:  General instructions  · Take over-the-counter and prescription medicines only as told by your doctor.  · Do not use products that contain nicotine or tobacco, such as  cigarettes and e-cigarettes. If you need help quitting, ask your doctor.  · Limit alcohol intake to no more than 1 drink per day for nonpregnant women and 2 drinks per day for men. One drink equals 12 oz of beer, 5 oz of wine, or 1½ oz of hard liquor.  · Manage stress. If you need help with this, ask your doctor.  · Keep all follow-up visits as told by your doctor. This is important.  Eating and drinking    · Stay at a healthy weight.  · Exercise regularly, as told by your doctor.  · Drink enough fluid, especially when you:  ? Exercise.  ? Get sick.  ? Are in hot temperatures.  · Eat healthy foods, such as:  ? Low-fat (lean) proteins.  ? Complex carbs (complex carbohydrates), such as whole wheat bread or brown rice.  ? Fresh fruits and vegetables.  ? Low-fat dairy products.  ? Healthy fats.  · Drink enough fluid to keep your pee (urine) clear or pale yellow.  If you have diabetes:    · Make sure you know the symptoms of hyperglycemia.  · Follow your diabetes management plan, as told by your doctor. Make sure you:  ? Take insulin and medicines as told.  ? Follow your exercise plan.  ? Follow your meal plan. Eat on time. Do not skip meals.  ? Check your blood sugar as often as told. Make sure to check before and after exercise. If you exercise longer or in a different way than you normally do, check your blood sugar more often.  ? Follow your sick day plan whenever you cannot eat or drink normally. Make this plan ahead of time with your doctor.  · Share your diabetes management plan with people in your workplace, school, and household.  · Check your urine for ketones when you are ill and as told by your doctor.  · Carry a card or wear jewelry that says that you have diabetes.  Contact a doctor if:  · Your blood sugar level is higher than 240 mg/dL (13.3 mmol/L) for 2 days in a row.  · You have problems keeping your blood sugar in your target range.  · High blood sugar happens often for you.  Get help right away  if:  · You have trouble breathing.  · You have a change in how you think, feel, or act (mental status).  · You feel sick to your stomach (nauseous), and that feeling does not go away.  · You cannot stop throwing up (vomiting).  These symptoms may be an emergency. Do not wait to see if the symptoms will go away. Get medical help right away. Call your local emergency services (911 in the [place] Do not drive yourself to the hospital.  Summary  · Hyperglycemia is when the sugar (glucose) level in your blood is too high.  · High blood sugar can happen to people who do or do not have diabetes.  · Make sure you drink enough fluids, eat healthy foods, and exercise regularly.  · Contact your doctor if you have problems keeping your blood sugar in your target range.  This information is not intended to replace advice given to you by your health care provider. Make sure you discuss any questions you have with your health care provider.  Document Released: 10/15/2010 Document Revised: 09/04/2017 Document Reviewed: 09/04/2017  [employer] Patient Education © 2020 [employer]    Hypoglycemia  Hypoglycemia occurs when the level of sugar (glucose) in the blood is too low. Hypoglycemia can happen in people who do or do not have diabetes. It can develop quickly, and it can be a medical emergency. For most people with diabetes, a blood glucose level below 70 mg/dL (3.9 mmol/L) is considered hypoglycemia.  Glucose is a type of sugar that provides the body's main source of energy. Certain hormones (insulin and glucagon) control the level of glucose in the blood. Insulin lowers blood glucose, and glucagon raises blood glucose. Hypoglycemia can result from having too much insulin in the bloodstream, or from not eating enough food that contains glucose. You may also have reactive hypoglycemia, which happens within 4 hours after eating a meal.  What are the causes?  Hypoglycemia occurs most often in people who have diabetes and may be caused  by:  · Diabetes medicine.  · Not eating enough, or not eating often enough.  · Increased physical activity.  · Drinking alcohol on an empty stomach.  If you do not have diabetes, hypoglycemia may be caused by:  · A tumor in the pancreas.  · Not eating enough, or not eating for long periods at a time (fasting).  · A severe infection or illness.  · Certain medicines.  What increases the risk?  Hypoglycemia is more likely to develop in:  · People who have diabetes and take medicines to lower blood glucose.  · People who abuse alcohol.  · People who have a severe illness.  What are the signs or symptoms?  Mild symptoms  Mild hypoglycemia may not cause any symptoms. If you do have symptoms, they may include:  · Hunger.  · Anxiety.  · Sweating and feeling clammy.  · Dizziness or feeling light-headed.  · Sleepiness.  · Nausea.  · Increased heart rate.  · Headache.  · Blurry vision.  · Irritability.  · Tingling or numbness around the mouth, lips, or tongue.  · A change in coordination.  · Restless sleep.  Moderate symptoms  Moderate hypoglycemia can cause:  · Mental confusion and poor judgment.  · Behavior changes.  · Weakness.  · Irregular heartbeat.  Severe symptoms  Severe hypoglycemia is a medical emergency. It can cause:  · Fainting.  · Seizures.  · Loss of consciousness (coma).  · Death.  How is this diagnosed?  Hypoglycemia is diagnosed with a blood test to measure your blood glucose level. This blood test is done while you are having symptoms. Your health care provider may also do a physical exam and review your medical history.  How is this treated?  This condition can often be treated by immediately eating or drinking something that contains sugar, such as:  · Fruit juice, 4-6 oz (120-150 mL).  · Regular soda (not diet soda), 4-6 oz (120-150 mL).  · Low-fat milk, 4 oz (120 mL).  · Several pieces of hard candy.  · Sugar or honey, 1 Tbsp (15 mL).  Treating hypoglycemia if you have diabetes  If you are alert and able  to swallow safely, follow the 15:15 rule:  · Take 15 grams of a rapid-acting carbohydrate. Talk with your health care provider about how much you should take.  · Rapid-acting options include:  ? Glucose pills (take 15 grams).  ? 6-8 pieces of hard candy.  ? 4-6 oz (120-150 mL) of fruit juice.  ? 4-6 oz (120-150 mL) of regular (not diet) soda.  ? 1 Tbsp (15 mL) honey or sugar.  · Check your blood glucose 15 minutes after you take the carbohydrate.  · If the repeat blood glucose level is still at or below 70 mg/dL (3.9 mmol/L), take 15 grams of a carbohydrate again.  · If your blood glucose level does not increase above 70 mg/dL (3.9 mmol/L) after 3 tries, seek emergency medical care.  · After your blood glucose level returns to normal, eat a meal or a snack within 1 hour.    Treating severe hypoglycemia  Severe hypoglycemia is when your blood glucose level is at or below 54 mg/dL (3 mmol/L). Severe hypoglycemia is a medical emergency. Get medical help right away.  If you have severe hypoglycemia and you cannot eat or drink, you may need an injection of glucagon. A family member or close friend should learn how to check your blood glucose and how to give you a glucagon injection. Ask your health care provider if you need to have an emergency glucagon injection kit available.  Severe hypoglycemia may need to be treated in a hospital. The treatment may include getting glucose through an IV. You may also need treatment for the cause of your hypoglycemia.  Follow these instructions at home:    General instructions  · Take over-the-counter and prescription medicines only as told by your health care provider.  · Monitor your blood glucose as told by your health care provider.  · Limit alcohol intake to no more than 1 drink a day for nonpregnant women and 2 drinks a day for men. One drink equals 12 oz of beer (355 mL), 5 oz of wine (148 mL), or 1½ oz of hard liquor (44 mL).  · Keep all follow-up visits as told by your health  care provider. This is important.  If you have diabetes:  · Always have a rapid-acting carbohydrate snack with you to treat low blood glucose.  · Follow your diabetes management plan as directed. Make sure you:  ? Know the symptoms of hypoglycemia. It is important to treat it right away to prevent it from becoming severe.  ? Take your medicines as directed.  ? Follow your exercise plan.  ? Follow your meal plan. Eat on time, and do not skip meals.  ? Check your blood glucose as often as directed. Always check before and after exercise.  ? Follow your sick day plan whenever you cannot eat or drink normally. Make this plan in advance with your health care provider.  · Share your diabetes management plan with people in your workplace, school, and household.  · Check your urine for ketones when you are ill and as told by your health care provider.  · Carry a medical alert card or wear medical alert jewelry.  Contact a health care provider if:  · You have problems keeping your blood glucose in your target range.  · You have frequent episodes of hypoglycemia.  Get help right away if:  · You continue to have hypoglycemia symptoms after eating or drinking something containing glucose.  · Your blood glucose is at or below 54 mg/dL (3 mmol/L).  · You have a seizure.  · You faint.  These symptoms may represent a serious problem that is an emergency. Do not wait to see if the symptoms will go away. Get medical help right away. Call your local emergency services (911 in the [place]  Summary  · Hypoglycemia occurs when the level of sugar (glucose) in the blood is too low.  · Hypoglycemia can happen in people who do or do not have diabetes. It can develop quickly, and it can be a medical emergency.  · Make sure you know the symptoms of hypoglycemia and how to treat it.  · Always have a rapid-acting carbohydrate snack with you to treat low blood sugar.  This information is not intended to replace advice given to you by your health  care provider. Make sure you discuss any questions you have with your health care provider.  Document Released: 12/18/2006 Document Revised: 06/10/2019 Document Reviewed: 01/20/2017  [employer] Patient Education © 2020 [employer]    Skin / Wound Care Instructions: Please contact your primary care physician for any change in skin integrity.     If You Have Surgical Incisions / Wounds:  Monitor surgical site(s) for signs of increased swelling, redness or symptoms of drainage from the site or fever as this could indicate signs and symptoms of infection. If these symptoms are noted, notifiy your primary care provider.      Discharge Safety Instructions: Should Have ADULT SUPERVISION     Discharge Safety Concerns: Unsteady Gait,Wandering  The interdisciplinary team has made recommendation that you should have adult supervision in the house due to balance problem and wandering  Anti-embolic stockings are not required to increase circulation to the lower extremities.    Discharge Diet: diabetic     Discharge Liquids: thin  Discharge Bowel Function: Continent  Please contact your primary care physician for any changes in bowel habits.  Discharge Bowel Program:    Discharge Bladder Function: Continent  Discharge Urinary Devices: None      Nursing Discharge Plan:   Smoking Cessation Offered: Patient Counseled    Case Management Discharge Instructions:   Discharge Location:    Agency Name/Address/Phone:    Home Health:    Outpatient Services:    DME Provider/Phone:    Medical Equipment Ordered:    Prescription Faxed to:        Discharge Medication Instructions:  Below are the medications your physician expects you to take upon discharge:Fall Prevention in the Home, Adult  Falls can cause injuries. They can happen to people of all ages. There are many things you can do to make your home safe and to help prevent falls. Ask for help when making these changes, if needed.  What actions can I take to prevent falls?  General  Instructions  · Use good lighting in all rooms. Replace any light bulbs that burn out.  · Turn on the lights when you go into a dark area. Use night-lights.  · Keep items that you use often in easy-to-reach places. Lower the shelves around your home if necessary.  · Set up your furniture so you have a clear path. Avoid moving your furniture around.  · Do not have throw rugs and other things on the floor that can make you trip.  · Avoid walking on wet floors.  · If any of your floors are uneven, fix them.  · Add color or contrast paint or tape to clearly [place] and help you see:  ? Any grab bars or handrails.  ? First and last steps of stairways.  ? Where the edge of each step is.  · If you use a stepladder:  ? Make sure that it is fully opened. Do not climb a closed stepladder.  ? Make sure that both sides of the stepladder are locked into place.  ? Ask someone to hold the stepladder for you while you use it.  · If there are any pets around you, be aware of where they are.  What can I do in the bathroom?         · Keep the floor dry. Clean up any water that spills onto the floor as soon as it happens.  · Remove soap buildup in the tub or shower regularly.  · Use non-skid mats or decals on the floor of the tub or shower.  · Attach bath mats securely with double-sided, non-slip rug tape.  · If you need to sit down in the shower, use a plastic, non-slip stool.  · Install grab bars by the toilet and in the tub and shower. Do not use towel bars as grab bars.  What can I do in the bedroom?  · Make sure that you have a light by your bed that is easy to reach.  · Do not use any sheets or blankets that are too big for your bed. They should not hang down onto the floor.  · Have a firm chair that has side arms. You can use this for support while you get dressed.  What can I do in the kitchen?  · Clean up any spills right away.  · If you need to reach something above you, use a strong step stool that has a grab bar.  · Keep  electrical cords out of the way.  · Do not use floor polish or wax that makes floors slippery. If you must use wax, use non-skid floor wax.  What can I do with my stairs?  · Do not leave any items on the stairs.  · Make sure that you have a light switch at the top of the stairs and the bottom of the stairs. If you do not have them, ask someone to add them for you.  · Make sure that there are handrails on both sides of the stairs, and use them. Fix handrails that are broken or loose. Make sure that handrails are as long as the stairways.  · Install non-slip stair treads on all stairs in your home.  · Avoid having throw rugs at the top or bottom of the stairs. If you do have throw rugs, attach them to the floor with carpet tape.  · Choose a carpet that does not hide the edge of the steps on the stairway.  · Check any carpeting to make sure that it is firmly attached to the stairs. Fix any carpet that is loose or worn.  What can I do on the outside of my home?  · Use bright outdoor lighting.  · Regularly fix the edges of walkways and driveways and fix any cracks.  · Remove anything that might make you trip as you walk through a door, such as a raised step or threshold.  · Trim any bushes or trees on the path to your home.  · Regularly check to see if handrails are loose or broken. Make sure that both sides of any steps have handrails.  · Install guardrails along the edges of any raised decks and porches.  · Clear walking paths of anything that might make someone trip, such as tools or rocks.  · Have any leaves, snow, or ice cleared regularly.  · Use sand or salt on walking paths during winter.  · Clean up any spills in your garage right away. This includes grease or oil spills.  What other actions can I take?  · Wear shoes that:  ? Have a low heel. Do not wear high heels.  ? Have rubber bottoms.  ? Are comfortable and fit you well.  ? Are closed at the toe. Do not wear open-toe sandals.  · Use tools that help you move  around (mobility aids) if they are needed. These include:  ? Canes.  ? Walkers.  ? Scooters.  ? Crutches.  · Review your medicines with your doctor. Some medicines can make you feel dizzy. This can increase your chance of falling.  Ask your doctor what other things you can do to help prevent falls.  Where to find more information  · Centers for Disease Control and Prevention, [employer]: https://cdc.gov  · [employer] Aging: https://fa9utlq.lulu.nih.gov  Contact a doctor if:  · You are afraid of falling at home.  · You feel weak, drowsy, or dizzy at home.  · You fall at home.  Summary  · There are many simple things that you can do to make your home safe and to help prevent falls.  · Ways to make your home safe include removing tripping hazards and installing grab bars in the bathroom.  · Ask for help when making these changes in your home.  This information is not intended to replace advice given to you by your health care provider. Make sure you discuss any questions you have with your health care provider.  Document Released: 10/14/2010 Document Revised: 04/09/2020 Document Reviewed: 08/02/2018  [employer] Patient Education © 2020 [employer]  COVID-19  COVID-19 is a respiratory infection that is caused by a virus called severe acute respiratory syndrome coronavirus 2 (SARS-CoV-2). The disease is also known as coronavirus disease or novel coronavirus. In some people, the virus may not cause any symptoms. In others, it may cause a serious infection. The infection can get worse quickly and can lead to complications, such as:  · Pneumonia, or infection of the lungs.  · Acute respiratory distress syndrome or ARDS. This is fluid build-up in the lungs.  · Acute respiratory failure. This is a condition in which there is not enough oxygen passing from the lungs to the body.  · Sepsis or septic shock. This is a serious bodily reaction to an infection.  · Blood clotting problems.  · Secondary infections due to  bacteria or fungus.  The virus that causes COVID-19 is contagious. This means that it can spread from person to person through droplets from coughs and sneezes (respiratory secretions).  What are the causes?  This illness is caused by a virus. You may catch the virus by:  · Breathing in droplets from an infected person's cough or sneeze.  · Touching something, like a table or a doorknob, that was exposed to the virus (contaminated) and then touching your mouth, nose, or eyes.  What increases the risk?  Risk for infection  You are more likely to be infected with this virus if you:  · Live in or travel to an area with a COVID-19 outbreak.  · Come in contact with a sick person who recently traveled to an area with a COVID-19 outbreak.  · Provide care for or live with a person who is infected with COVID-19.  Risk for serious illness  You are more likely to become seriously ill from the virus if you:  · Are 65 years of age or older.  · Have a long-term disease that lowers your body's ability to fight infection (immunocompromised).  · Live in a nursing home or long-term care facility.  · Have a long-term (chronic) disease such as:  ? Chronic lung disease, including chronic obstructive pulmonary disease or asthma  ? Heart disease.  ? Diabetes.  ? Chronic kidney disease.  ? Liver disease.  · Are obese.  What are the signs or symptoms?  Symptoms of this condition can range from mild to severe. Symptoms may appear any time from 2 to 14 days after being exposed to the virus. They include:  · A fever.  · A cough.  · Difficulty breathing.  · Chills.  · Muscle pains.  · A sore throat.  · Loss of taste or smell.  Some people may also have stomach problems, such as nausea, vomiting, or diarrhea.  Other people may not have any symptoms of COVID-19.  How is this diagnosed?  This condition may be diagnosed based on:  · Your signs and symptoms, especially if:  ? You live in an area with a COVID-19 outbreak.  ? You recently traveled to  or from an area where the virus is common.  ? You provide care for or live with a person who was diagnosed with COVID-19.  · A physical exam.  · Lab tests, which may include:  ? A nasal swab to take a sample of fluid from your nose.  ? A throat swab to take a sample of fluid from your throat.  ? A sample of mucus from your lungs (sputum).  ? Blood tests.  · Imaging tests, which may include, X-rays, CT scan, or ultrasound.  How is this treated?  At present, there is no medicine to treat COVID-19. Medicines that treat other diseases are being used on a trial basis to see if they are effective against COVID-19.  Your health care provider will talk with you about ways to treat your symptoms. For most people, the infection is mild and can be managed at home with rest, fluids, and over-the-counter medicines.  Treatment for a serious infection usually takes places in a hospital intensive care unit (ICU). It may include one or more of the following treatments. These treatments are given until your symptoms improve.  · Receiving fluids and medicines through an IV.  · Supplemental oxygen. Extra oxygen is given through a tube in the nose, a face mask, or a mast.  · Positioning you to lie on your stomach (prone position). This makes it easier for oxygen to get into the lungs.  · Continuous positive airway pressure (CPAP) or bi-level positive airway pressure (BPAP) machine. This treatment uses mild air pressure to keep the airways open. A tube that is connected to a motor delivers oxygen to the body.  · Ventilator. This treatment moves air into and out of the lungs by using a tube that is placed in your windpipe.  · Tracheostomy. This is a procedure to create a hole in the neck so that a breathing tube can be inserted.  · Extracorporeal membrane oxygenation (ECMO). This procedure gives the lungs a chance to recover by taking over the functions of the heart and lungs. It supplies oxygen to the body and removes carbon  dioxide.  Follow these instructions at home:  Lifestyle  · If you are sick, stay home except to get medical care. Your health care provider will tell you how long to stay home. Call your health care provider before you go for medical care.  · Rest at home as told by your health care provider.  · Do not use any products that contain nicotine or tobacco, such as cigarettes, e-cigarettes, and chewing tobacco. If you need help quitting, ask your health care provider.  · Return to your normal activities as told by your health care provider. Ask your health care provider what activities are safe for you.  General instructions  · Take over-the-counter and prescription medicines only as told by your health care provider.  · Drink enough fluid to keep your urine pale yellow.  · Keep all follow-up visits as told by your health care provider. This is important.  How is this prevented?    There is no vaccine to help prevent COVID-19 infection. However, there are steps you can take to protect yourself and others from this virus.  To protect yourself:   · Do not travel to areas where COVID-19 is a risk. The areas where COVID-19 is reported change often. To identify high-risk areas and travel restrictions, check the CDC travel website: wwwnc.cdc.gov/travel/notices  · If you live in, or must travel to, an area where COVID-19 is a risk, take precautions to avoid infection.  ? Stay away from people who are sick.  ? Wash your hands often with soap and water for 20 seconds. If soap and water are not available, use an alcohol-based hand .  ? Avoid touching your mouth, face, eyes, or nose.  ? Avoid going out in public, follow guidance from your state and local health authorities.  ? If you must go out in public, wear a cloth face covering or face mask.  ? Disinfect objects and surfaces that are frequently touched every day. This may include:  § Counters and tables.  § Doorknobs and light switches.  § Sinks and  faucets.  § Electronics, such as phones, remote controls, keyboards, computers, and tablets.  To protect others:  If you have symptoms of COVID-19, take steps to prevent the virus from spreading to others.  · If you think you have a COVID-19 infection, contact your health care provider right away. Tell your health care team that you think you may have a COVID-19 infection.  · Stay home. Leave your house only to seek medical care. Do not use public transport.  · Do not travel while you are sick.  · Wash your hands often with soap and water for 20 seconds. If soap and water are not available, use alcohol-based hand .  · Stay away from other members of your household. Let healthy household members care for children and pets, if possible. If you have to care for children or pets, wash your hands often and wear a mask. If possible, stay in your own room, separate from others. Use a different bathroom.  · Make sure that all people in your household wash their hands well and often.  · Cough or sneeze into a tissue or your sleeve or elbow. Do not cough or sneeze into your hand or into the air.  · Wear a cloth face covering or face mask.  Where to find more information  · Centers for Disease Control and Prevention: www.cdc.gov/coronavirus/2019-ncov/index.html  · World Health Organization: www.who.int/health-topics/coronavirus  Contact a health care provider if:  · You live in or have traveled to an area where COVID-19 is a risk and you have symptoms of the infection.  · You have had contact with someone who has COVID-19 and you have symptoms of the infection.  Get help right away if:  · You have trouble breathing.  · You have pain or pressure in your chest.  · You have confusion.  · You have bluish lips and fingernails.  · You have difficulty waking from sleep.  · You have symptoms that get worse.  These symptoms may represent a serious problem that is an emergency. Do not wait to see if the symptoms will go away.  Get medical help right away. Call your local emergency services (911 in the [place] Do not drive yourself to the hospital. Let the emergency medical personnel know if you think you have COVID-19.  Summary  · COVID-19 is a respiratory infection that is caused by a virus. It is also known as coronavirus disease or novel coronavirus. It can cause serious infections, such as pneumonia, acute respiratory distress syndrome, acute respiratory failure, or sepsis.  · The virus that causes COVID-19 is contagious. This means that it can spread from person to person through droplets from coughs and sneezes.  · You are more likely to develop a serious illness if you are 65 years of age or older, have a weak immunity, live in a nursing home, or have chronic disease.  · There is no medicine to treat COVID-19. Your health care provider will talk with you about ways to treat your symptoms.  · Take steps to protect yourself and others from infection. Wash your hands often and disinfect objects and surfaces that are frequently touched every day. Stay away from people who are sick and wear a mask if you are sick.  This information is not intended to replace advice given to you by your health care provider. Make sure you discuss any questions you have with your health care provider.  Document Released: 01/23/2020 Document Revised: 05/14/2020 Document Reviewed: 01/23/2020  [employer] Patient Education © 2020 [employer]  Depression / Suicide Risk    As you are discharged from this [facility] facility, it is important to learn how to keep safe from harming yourself.    Recognize the warning signs:  · Abrupt changes in personality, positive or negative- including increase in energy   · Giving away possessions  · Change in eating patterns- significant weight changes-  positive or negative  · Change in sleeping patterns- unable to sleep or sleeping all the time   · Unwillingness or inability to communicate  · Depression  · Unusual sadness,  discouragement and loneliness  · Talk of wanting to die  · Neglect of personal appearance   · Rebelliousness- reckless behavior  · Withdrawal from people/activities they love  · Confusion- inability to concentrate     If you or a loved one observes any of these behaviors or has concerns about self-harm, here's what you can do:  · Talk about it- your feelings and reasons for harming yourself  · Remove any means that you might use to hurt yourself (examples: pills, rope, extension cords, firearm)  · Get professional help from the community (Mental Health, Substance Abuse, psychological counseling)  · Do not be alone:Call your Safe Contact- someone whom you trust who will be there for you.  · Call your local CRISIS HOTLINE 897-0198 or 839-394-1699  · Call your local Children's Mobile Crisis Response Team [place] (518) 178-7927 or www.1-800-DOCTORS  · Call the toll free National Suicide Prevention Hotlines   · National Suicide Prevention Lifeline 936-573-HNZZ (7570)  · National Hope Line Network 800-SUICIDE (927-1074)    Occupational Therapy Discharge Instructions for Juan Lantigua    4/5/2022    Level of Assist Required for Eating: Able to Complete Eating without Assist  Level of Assist Required for Grooming: Able to Complete Grooming without Assist  Level of Assist Required for Dressing: Able to Complete Dressing without Assist  Level of Assist Required for Toileting: Able to Complete Toileting without Assist  Level of Assist Required for Toilet Transfer: Able to Complete Toilet Transfer without Assist  Equipment for Toilet Transfer: Commode over Toilet  Level of Assist Required for Bathing: Requires Supervision with Bathing (you must waterproof  your  limb until sutures/stapes are out. IF you do not you  have a high increase for infection.)  Equipment for Bathing: Grab Bars in Tub / Shower,Tub Transfer Bench (increased risk of fall if you do not use the recommended equipment)  Level of Assist Required for  Shower Transfer: Requires Physical Assist with Shower Transfer (hands on assist from your wife for  safety)  Equipment for Shower Transfer: Tub Transfer Bench,Grab Bars in Tub / Shower  Driving: May not Drive, Please Contact Physician for Further Information   Best of luck to you  Angelo VELOZ

## 2022-04-05 NOTE — THERAPY
Physical Therapy   Daily Treatment     Patient Name: Juan Lantigua  Age:  46 y.o., Sex:  male  Medical Record #: [id number]  Today's Date: 4/5/2022     Precautions  Precautions: Fall Risk,Non Weight Bearing Left Lower Extremity    Subjective    Pt agreeable to practice crutch training     Objective     04/05/22 1145   Interdisciplinary Plan of Care Collaboration   IDT Collaboration with  Family / Caregiver;Nursing;Other (See Comments)  (therapy manager)   Collaboration Comments FT/education with patient   PT Total Time Spent   PT Individual Total Time Spent (Mins) 15   PT Charge Group   PT Gait Training 1   Supervising Physical Therapist Zulema Harmon     Completed BKA education including use of IPOP of all mobility/transfers(can be off for  an hour a day for ROM/exercises) reviewed desensitization, safety with mobility, HEP, contracture prevention, recommended assistive equipment for HH and community mobility.    Instructed pt's SO to use CGA with gait belt initially for outdoor/uneven terrain, she returned verbal comprehension.    Assessment    The patient amb with B AC and SBA on indoor level surfaces, progressing to SPV. Pt completed all BKA edu and instructions and [facility]  Strengths: Able to follow instructions,Adequate strength,Alert and oriented,Effective communication skills,Independent prior level of function,Motivated for self care and independence,Pleasant and cooperative,Willingly participates in therapeutic activities  Barriers: Limited mobility,Pain,Fatigue    Plan    Dc home with outpatient PT to cont to progress functional mobility, intermittent SPV from SO/cousin    Physical Therapy Problems (Active)       Problem: Balance       Dates: Start: 04/02/22         Goal: STG-Within one week, patient will maintain dynamic standing x 5 min with FWW, SBA       Dates: Start: 04/02/22               Problem: Mobility       Dates: Start: 04/02/22         Goal: STG-Within one  week, patient will propel wheelchair community distances  Lucinda over indoor surfaces, supervised outdoor surfaces, >300ft       Dates: Start: 04/02/22         Goal Note filed on 04/05/22 0832 by Yani De La Rosa, PTA       Indoor only              Goal: STG-Within one week, patient will ambulate household distance with FWW, SBA, 150ft.       Dates: Start: 04/02/22            Goal: STG-Within one week, patient will ambulate up/down a curb with FWW, SBA       Dates: Start: 04/02/22               Problem: PT-Long Term Goals       Dates: Start: 04/02/22         Goal: LTG-By discharge, patient will tolerate standing x 10 minutes with FWW, Lucinda, pain <3/10       Dates: Start: 04/02/22            Goal: LTG-By discharge, patient will ambulate x 200 ft with FWW, Lucinda       Dates: Start: 04/02/22            Goal: LTG-By discharge, patient will transfer one surface to another Lucinda with LRAD       Dates: Start: 04/02/22            Goal: LTG-By discharge, patient will perform home exercise program Lucinda for stretching/strengthening L residual limb       Dates: Start: 04/02/22            Goal: LTG-By discharge, patient will ambulate up/down 4-6 stairs, SBA with LRAD/railings.       Dates: Start: 04/02/22

## 2022-04-05 NOTE — THERAPY
"Occupational Therapy  Daily Treatment     Patient Name: Juan Lantigua  Age:  46 y.o., Sex:  male  Medical Record #: [id number]  Today's Date: 4/5/2022     Precautions  Precautions: (P) Fall Risk,Non Weight Bearing Left Lower Extremity         Subjective    \"  I had a bad night las night .\"    Provided lengthy description of pain issues,  lack of sleep , issues with nursing related to   getting snack.     Provided active listening and validation.      Objective       04/05/22 0701   Precautions   Precautions Fall Risk;Non Weight Bearing Left Lower Extremity   Functional Level of Assist   Eating Independent   Grooming Independent   Bathing Supervision  (sponge bathe at edge of bed  decliend shower due to fatigue  / lack of sleep night prior)   Upper Body Dressing Modified Independent   Lower Body Dressing Modified Independent  (doff/don  shorts  sock shoe   residual limb protector)   Interdisciplinary Plan of Care Collaboration   IDT Collaboration with  Nursing   Patient Position at End of Therapy In Bed;Call Light within Reach;Tray Table within Reach   Collaboration Comments collaborated with night shift charge RN before  treating patient   OT Total Time Spent   OT Individual Total Time Spent (Mins) 60   OT Charge Group   OT Self Care / ADL 2   OT Therapy Activity 2       Reviewed utilizing distraction and  progressive relaxation techniques  for use as  assisting with pain management   Assessment    Remains determined to return home today regardless of  IDT recommendations   Desire return more so  after issues with staff night prior    Demonstrates improving  independence with basic  ADL  though declined shower routine due to fatigue.      Reported pain through out session but did not hinder active participation  in tasks performed this session     Strengths: Able to follow instructions,Adequate strength,Alert and oriented,Effective communication skills,Good insight into deficits/needs,Independent prior level of " function,Manages pain appropriately,Motivated for self care and independence,Pleasant and cooperative,Supportive family,Willingly participates in therapeutic activities  Barriers: Impaired balance,Limited mobility,Pain    Plan  Collaborate with  PT   referral to prosthetist to obtain limb protector.     ADL  IADL , related mobility  at w/c and FWW levels    strength/endurance building  standing tolerance and balance actiivty     Occupational Therapy Goals (Active)       Problem: Bathing       Dates: Start: 04/02/22         Goal: STG-Within one week, patient will bathe with modified independence.        Dates: Start: 04/02/22               Problem: Dressing       Dates: Start: 04/02/22         Goal: STG-Within one week, patient will dress LB with supervision and use of AE as needed.       Dates: Start: 04/02/22               Problem: Functional Transfers       Dates: Start: 04/02/22         Goal: STG-Within one week, patient will perform bathroom transfers with supervision and use of AE as needed.       Dates: Start: 04/02/22               Problem: OT Long Term Goals       Dates: Start: 04/02/22         Goal: LTG-By discharge, patient will complete basic self care tasks with modified independence and use of AE as needed.       Dates: Start: 04/02/22            Goal: LTG-By discharge, patient will perform bathroom transfers with modified independence and use of AE as needed.       Dates: Start: 04/02/22            Goal: LTG-By discharge, patient will complete basic home management with setup and supervision.        Dates: Start: 04/02/22               Problem: Toileting       Dates: Start: 04/02/22         Goal: STG-Within one week, patient will complete toileting tasks with supervision and use of AE as needed.       Dates: Start: 04/02/22

## 2022-04-05 NOTE — CARE PLAN
Problem: Bathing  Goal: STG-Within one week, patient will bathe with modified independence.   Outcome: Met  Note:  Note  performed sponge bathing  declined shower         Problem: Dressing  Goal: STG-Within one week, patient will dress LB with supervision and use of AE as needed.  Outcome: Met  Note: Supervision to mod I  no AE  needed   Dons  residual limb protector along with LB clothing        Problem: Toileting  Goal: STG-Within one week, patient will complete toileting tasks with supervision and use of AE as needed.  Outcome: Met     Problem: Functional Transfers  Goal: STG-Within one week, patient will perform bathroom transfers with supervision and use of AE as needed.  Outcome: Met      no abdominal pain/no vomiting/no diarrhea/no nausea

## 2022-04-05 NOTE — DISCHARGE PLANNING
Patient is leaving AMA. Does not want to do family training with crutches or insulin education. Follow up appointments scheduled with new PCP and Dr. Wheatley. Patient is now staying for training. Not leaving AMA.

## 2022-04-06 NOTE — CARE PLAN
Problem: Recreation Therapy  Goal: STG-Within one week, patient will demonstrate a standing tolerance during a leisure activity that involves weight shift and standing for 3 minutes x3 with no LOB CGA.   Outcome: Discharged - Not Met  Goal: LTG-By discharge, patient will demonstrate leisure problem solving by sharing on two positive lesiure activities they would like to participate in post dc and any perceived barriers to their participation.  Outcome: Discharged - Not Met  Goal: LTG-By discharge, patient will demonstrate a standing tolerance during a leisure activity that involves weight shift and standing for 5 minutes x2 with no LOB CGA.   Outcome: Discharged - Not Met

## 2022-04-13 ENCOUNTER — OFFICE VISIT (OUTPATIENT)
Dept: MEDICAL GROUP | Facility: CLINIC | Age: 47
End: 2022-04-13
Payer: MEDICAID

## 2022-04-13 VITALS
OXYGEN SATURATION: 98 % | WEIGHT: 175 LBS | HEIGHT: 70 IN | BODY MASS INDEX: 25.05 KG/M2 | DIASTOLIC BLOOD PRESSURE: 70 MMHG | TEMPERATURE: 97.8 F | SYSTOLIC BLOOD PRESSURE: 126 MMHG | HEART RATE: 97 BPM | RESPIRATION RATE: 16 BRPM

## 2022-04-13 DIAGNOSIS — D50.8 IRON DEFICIENCY ANEMIA SECONDARY TO INADEQUATE DIETARY IRON INTAKE: ICD-10-CM

## 2022-04-13 DIAGNOSIS — Z89.512 S/P BKA (BELOW KNEE AMPUTATION) UNILATERAL, LEFT (HCC): ICD-10-CM

## 2022-04-13 DIAGNOSIS — Z12.5 SCREENING FOR MALIGNANT NEOPLASM OF PROSTATE: ICD-10-CM

## 2022-04-13 DIAGNOSIS — I10 PRIMARY HYPERTENSION: ICD-10-CM

## 2022-04-13 DIAGNOSIS — E11.65 TYPE 2 DIABETES MELLITUS WITH HYPERGLYCEMIA, WITH LONG-TERM CURRENT USE OF INSULIN (HCC): ICD-10-CM

## 2022-04-13 DIAGNOSIS — E55.9 VITAMIN D DEFICIENCY: ICD-10-CM

## 2022-04-13 DIAGNOSIS — Z13.220 SCREENING FOR CHOLESTEROL LEVEL: ICD-10-CM

## 2022-04-13 DIAGNOSIS — Z79.4 TYPE 2 DIABETES MELLITUS WITH HYPERGLYCEMIA, WITH LONG-TERM CURRENT USE OF INSULIN (HCC): ICD-10-CM

## 2022-04-13 PROCEDURE — 99204 OFFICE O/P NEW MOD 45 MIN: CPT | Performed by: PHYSICIAN ASSISTANT

## 2022-04-13 ASSESSMENT — FIBROSIS 4 INDEX: FIB4 SCORE: 0.57

## 2022-04-13 NOTE — LETTER
[facility]  Dottie Rouse P.A.-C.  [address]  [facility] [address]  Fax: [phone]   Authorization for Release/Disclosure of   Protected Health Information   Name: JUAN BRIGGS : 1975 SSN: [id number]   Address: [address] [place] [address] [place] [address] Phone:    [phone] (home)    I authorize the entity listed below to release/disclose the PHI below to:   [facility]/Dottie Rouse P.A.-C. and Dottie Rouse P.A.-C.   Provider or Entity Name:  [facility]   Address   City, [facility], [place], [place] Phone:      Fax:     Reason for request: continuity of care   Information to be released:    [  ] LAST COLONOSCOPY,  including any PATH REPORT and follow-up  [  ] LAST FIT/COLOGUARD RESULT [  ] LAST DEXA  [  ] LAST MAMMOGRAM  [  ] LAST PAP  [  ] LAST LABS [  ] RETINA EXAM REPORT  [  ] IMMUNIZATION RECORDS  [XXX] Release all info      [XXX] Check here and initial the line next to each item to release ALL health information INCLUDING  _____ Care and treatment for drug and / or alcohol abuse  _____ HIV testing, infection status, or AIDS  _____ Genetic Testing    DATES OF SERVICE OR TIME PERIOD TO BE DISCLOSED: _____________  I understand and acknowledge that:  * This Authorization may be revoked at any time by you in writing, except if your health information has already been used or disclosed.  * Your health information that will be used or disclosed as a result of you signing this authorization could be re-disclosed by the recipient. If this occurs, your re-disclosed health information may no longer be protected by State or Federal laws.  * You may refuse to sign this Authorization. Your refusal will not affect your ability to obtain treatment.  * This Authorization becomes effective upon signing and will  on (date) __________.      If no date is indicated, this Authorization will  one (1) year from the signature date.    Name: Juan  Grzegorz    Signature:   Date:     4/13/2022       PLEASE FAX REQUESTED RECORDS BACK TO: [phone]

## 2022-04-13 NOTE — LETTER
[email or web address]  Dottie Rouse P.A.-C.  [address] [address] [address]  [place] [address]  Fax: [phone]   Authorization for Release/Disclosure of   Protected Health Information   Name: WILBERTO BRIGGS : 1975 SSN: [id number]   Address: [address] [place] [address] [place] [address] Phone:    [phone] (home)    I authorize the entity listed below to release/disclose the PHI below to:   [facility]/Dottie Rouse P.A.-C. and Dottie Rouse P.A.-C.   Provider or Entity Name:  [facility] [place]   Address   [facility], [address]  [address] [place] [address] Phone:  [phone]    Fax:     Reason for request: continuity of care   Information to be released:    [  ] LAST COLONOSCOPY,  including any PATH REPORT and follow-up  [  ] LAST FIT/COLOGUARD RESULT [  ] LAST DEXA  [  ] LAST MAMMOGRAM  [  ] LAST PAP  [  ] LAST LABS [  ] RETINA EXAM REPORT  [  ] IMMUNIZATION RECORDS  [XXX] Release all info      [XXX] Check here and initial the line next to each item to release ALL health information INCLUDING  _____ Care and treatment for drug and / or alcohol abuse  _____ HIV testing, infection status, or AIDS  _____ Genetic Testing    DATES OF SERVICE OR TIME PERIOD TO BE DISCLOSED: _ to current_____  I understand and acknowledge that:  * This Authorization may be revoked at any time by you in writing, except if your health information has already been used or disclosed.  * Your health information that will be used or disclosed as a result of you signing this authorization could be re-disclosed by the recipient. If this occurs, your re-disclosed health information may no longer be protected by State or Federal laws.  * You may refuse to sign this Authorization. Your refusal will not affect your ability to obtain treatment.  * This Authorization becomes effective upon signing and will  on (date) __________.      If no date is indicated, this Authorization will   one (1) year from the signature date.    Name: Juan Lantigua    Signature:   Date:     2022       PLEASE FAX REQUESTED RECORDS BACK TO: [phone]

## 2022-04-13 NOTE — LETTER
[employer]  Dottie Rouse P.A.-C.  [address] [address]  Fax: [phone]   Authorization for Release/Disclosure of   Protected Health Information   Name: JUAN BRIGGS : 1975 SSN: [id number]   Address: [address] [place] [address] [place] [address] Phone:    [phone] (home)    I authorize the entity listed below to release/disclose the PHI below to:   [facility]/Dottie Rouse P.A.-C. and Dottie Rouse P.A.-C.   Provider [facility]   Address   City, State, [place], [place] Phone:      Fax:     Reason for request: continuity of care   Information to be released:    [  ] LAST COLONOSCOPY,  including any PATH REPORT and follow-up  [  ] LAST FIT/COLOGUARD RESULT [  ] LAST DEXA  [  ] LAST MAMMOGRAM  [  ] LAST PAP  [  ] LAST LABS [  ] RETINA EXAM REPORT  [  ] IMMUNIZATION RECORDS  [  ] Release all info      [  ] Check here and initial the line next to each item to release ALL health information INCLUDING  _____ Care and treatment for drug and / or alcohol abuse  _____ HIV testing, infection status, or AIDS  _____ Genetic Testing    DATES OF SERVICE OR TIME PERIOD TO BE DISCLOSED: _____________  I understand and acknowledge that:  * This Authorization may be revoked at any time by you in writing, except if your health information has already been used or disclosed.  * Your health information that will be used or disclosed as a result of you signing this authorization could be re-disclosed by the recipient. If this occurs, your re-disclosed health information may no longer be protected by State or Federal laws.  * You may refuse to sign this Authorization. Your refusal will not affect your ability to obtain treatment.  * This Authorization becomes effective upon signing and will  on (date) __________.      If no date is indicated, this Authorization will  one (1) year from the signature date.    Name: Juan  Grzegorz    Signature:   Date:     4/13/2022       PLEASE FAX REQUESTED RECORDS BACK TO: [phone]

## 2022-04-15 NOTE — PROGRESS NOTES
Subjective   Juan Lantigua is a 46 y.o. male who presents with Establish Care (Hospital fv. Change bandages )    1. S/P BKA (below knee amputation) unilateral, left (HCC)  Patient here today to establish care and hospital follow-up.  He was recently discharged from the hospital on 4/5/2022 after a left below the knee amputation for a left foot infection with gangrene and osteomylitis on 03/27/2022. He was told to follow up with Orthopedics but has not made the appointment. Needs to have bandages changed and wound checked. I do not have supplies in clinic needed to change bandages currently. They do have materials at home to do so and state they will change it when they return home. Has an upcoming appointment with physical medicine for stump check as well. Will place referral to orthopedics for follow up care.   Referral to Orthopedics    2. Type 2 diabetes mellitus with hyperglycemia, with long-term current use of insulin ([facility])  Chronic condition. Diagnosed at age 24 and has been on insulin since. Does not have current lab work available except from hospital stay. Need to recheck labs and ensure abnormalities have returned to baseline. Will follow up in one month with results.   Comp Metabolic Panel; Future   ESTIMATED GFR; Future   HEMOGLOBIN A1C; Future   MICROALBUMIN CREAT RATIO URINE; Future   Lipid Profile; Future    3. Primary hypertension  Currently treated for hypertension, taking medications as prescribed.  Denies chest pain or shortness of breath. Blood pressure in the office is 126/70. Does not need refills at this time.   Controlled    4. Vitamin D deficiency  Chronic condition. Was started on vitamin D 50,000 units weekly by previous provider. Due for labs to check levels in about 3 months.    5. Iron deficiency anemia secondary to inadequate dietary iron intake  Noted anemia in the hospital. Due for recheck of labs to confirm. Will address deficiencies at next visit as appropriate.   CBC WITH  "DIFFERENTIAL; Future   IRON/TOTAL IRON BIND; Future   VITAMIN B12; Future   FOLATE; Future    6. Screening for cholesterol level  Has not had levels checked \"in awhile\". Will follow up with results. Not currently on a statin despite being diabetic. Will discuss this further at next appointment.   Lipid Profile; Future    7. Screening for malignant neoplasm of prostate  Due for routine screening.   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  No date: Substance abuse (HCC)      Comment:  Alcohol, meth - sober since 10/21  Past Surgical History:  2022: AMPUTATION, BELOW THE KNEE; Left  2002: OPEN REDUCTION; Right      Comment:  Right humerous  Social History    Tobacco Use      Smoking status: Former Smoker        Packs/day: 0.50        Years: 26.00        Pack years: 13        Types: Cigarettes        Quit date: 3/10/2022        Years since quittin.0      Smokeless tobacco: Never Used    Vaping Use      Vaping Use: Never used    Alcohol use: Yes      Alcohol/week: 1.2 oz      Types: 2 Cans of beer per week      Comment: rarely     Drug use: Yes      Frequency: 7.0 times per week      Types: Marijuana    Review of patient's family history indicates:  Problem: Diabetes      Relation: Mother          Age of Onset: (Not Specified)  Problem: Breast Cancer      Relation: Mother          Age of Onset: (Not Specified)  Problem: Diabetes      Relation: Father          Age of Onset: (Not Specified)  Problem: Heart Failure      Relation: Father          Age of Onset: (Not Specified)  Problem: Cancer      Relation: Maternal Aunt          Age of Onset: (Not Specified)          Comment: throat  Problem: Heart Disease      Relation: Neg Hx          Age of Onset: (Not Specified)      Current Outpatient " "Medications: •  insulin glargine (LANTUS SOLOSTAR) 100 UNIT/ML Solution Pen-injector injection, Inject 10 Units under the skin every evening for 30 days., Disp: 3 mL, Rfl: 0•  acetaminophen (TYLENOL) 325 MG Tab, Take 2 Tablets by mouth every four hours as needed., Disp: 30 Tablet, Rfl: 0•  gabapentin (NEURONTIN) 300 MG Cap, Take 1 Capsule by mouth 3 times a day., Disp: 90 Capsule, Rfl: 0•  insulin regular (HUMULIN R) 100 Unit/mL Solution, Inject 2-12 Units under the skin 4 Times a Day,Before Meals and at Bedtime., Disp: 10 mL, Rfl: 2•  lisinopril (PRINIVIL) 10 MG Tab, Take 1 Tablet by mouth every day., Disp: 30 Tablet, Rfl: 0•  vitamin D2, Ergocalciferol, (DRISDOL) 1.25 MG (47169 UT) Cap capsule, Take 1 Capsule by mouth every 7 days., Disp: 7 Capsule, Rfl: 0    Patient was instructed on the use of medications, either prescriptions or OTC and informed on when the appropriate follow up time period should be. In addition, patient was also instructed that should any acute worsening occur that they should notify this clinic asap or call 911.      Review of Systems   Constitutional: Negative.    HENT: Negative.    Eyes: Negative.    Respiratory: Negative.    Cardiovascular: Negative.    Gastrointestinal: Negative.    Genitourinary: Negative.    Musculoskeletal: Negative for back pain, falls, joint pain, myalgias and neck pain.        New Left BKA with phantom pain and stump tenderness.   Skin: Negative.    Neurological: Negative.    Endo/Heme/Allergies: Negative.    Psychiatric/Behavioral: Negative.      Objective     /70 (BP Location: Left arm, Patient Position: Sitting, BP Cuff Size: Large adult)   Pulse 97   Temp 36.6 °C (97.8 °F) (Temporal)   Resp 16   Ht 1.778 m (5' 10\") Comment: pt is in wheelchair  Wt 79.4 kg (175 lb) Comment: pt is in wheelchair  SpO2 98%   BMI 25.11 kg/m²      Physical Exam  Vitals and nursing note reviewed.   Constitutional:       Appearance: Normal appearance. He is well-developed " and well-groomed.   HENT:      Head: Normocephalic and atraumatic.      Nose: Nose normal.      Mouth/Throat:      Lips: Pink. No lesions.      Mouth: Mucous membranes are moist.   Eyes:      General: Lids are normal. Vision grossly intact. Gaze aligned appropriately.      Extraocular Movements: Extraocular movements intact.      Conjunctiva/sclera: Conjunctivae normal.      Pupils: Pupils are equal, round, and reactive to light.   Neck:      Thyroid: No thyromegaly.      Vascular: No carotid bruit or JVD.      Trachea: Trachea and phonation normal.   Cardiovascular:      Rate and Rhythm: Regular rhythm. Tachycardia present.      Heart sounds: Normal heart sounds. No murmur heard.    No friction rub. No gallop.   Pulmonary:      Effort: Pulmonary effort is normal.      Breath sounds: Normal breath sounds. No wheezing, rhonchi or rales.   Musculoskeletal:         General: Normal range of motion.      Cervical back: Normal range of motion and neck supple.      Right lower leg: No edema.      Left lower leg: No edema.      Left Lower Extremity: Left leg is amputated below knee.   Lymphadenopathy:      Cervical: No cervical adenopathy.   Skin:     General: Skin is warm and dry.      Capillary Refill: Capillary refill takes less than 2 seconds.      Findings: No lesion or rash.   Neurological:      Mental Status: He is alert and oriented to person, place, and time.      Cranial Nerves: Cranial nerves are intact.   Psychiatric:         Attention and Perception: Attention and perception normal.         Mood and Affect: Mood and affect normal.         Speech: Speech normal.         Behavior: Behavior normal. Behavior is cooperative.         Thought Content: Thought content normal.         Judgment: Judgment normal.       Assessment & Plan      1. S/P BKA (below knee amputation) unilateral, left (HCC)  - Referral to Orthopedics    2. Type 2 diabetes mellitus with hyperglycemia, with long-term current use of insulin ([facility])  -  Comp Metabolic Panel; Future  - ESTIMATED GFR; Future  - HEMOGLOBIN A1C; Future  - MICROALBUMIN CREAT RATIO URINE; Future  - Lipid Profile; Future    3. Primary hypertension    4. Vitamin D deficiency    5. Iron deficiency anemia secondary to inadequate dietary iron intake  - CBC WITH DIFFERENTIAL; Future  - IRON/TOTAL IRON BIND; Future  - VITAMIN B12; Future  - FOLATE; Future    6. Screening for cholesterol level  - Lipid Profile; Future    7. Screening for malignant neoplasm of prostate  - PROSTATE SPECIFIC AG SCREENING; Future

## 2022-04-19 ASSESSMENT — ENCOUNTER SYMPTOMS
NEUROLOGICAL NEGATIVE: 1
CONSTITUTIONAL NEGATIVE: 1
RESPIRATORY NEGATIVE: 1
GASTROINTESTINAL NEGATIVE: 1
CARDIOVASCULAR NEGATIVE: 1
PSYCHIATRIC NEGATIVE: 1
FALLS: 0
BACK PAIN: 0
MYALGIAS: 0
EYES NEGATIVE: 1
NECK PAIN: 0

## 2022-04-19 ASSESSMENT — VISUAL ACUITY: OU: 1

## 2023-09-08 ENCOUNTER — TELEPHONE (OUTPATIENT)
Dept: HEALTH INFORMATION MANAGEMENT | Facility: OTHER | Age: 48
End: 2023-09-08
Payer: MEDICAID

## 2023-11-17 ENCOUNTER — TELEPHONE (OUTPATIENT)
Dept: HEALTH INFORMATION MANAGEMENT | Facility: OTHER | Age: 48
End: 2023-11-17
Payer: MEDICAID